# Patient Record
Sex: MALE | Race: AMERICAN INDIAN OR ALASKA NATIVE | NOT HISPANIC OR LATINO | URBAN - METROPOLITAN AREA
[De-identification: names, ages, dates, MRNs, and addresses within clinical notes are randomized per-mention and may not be internally consistent; named-entity substitution may affect disease eponyms.]

---

## 2018-05-26 ENCOUNTER — INPATIENT (INPATIENT)
Facility: HOSPITAL | Age: 28
LOS: 3 days | Discharge: HOME | End: 2018-05-30
Attending: INTERNAL MEDICINE | Admitting: INTERNAL MEDICINE
Payer: COMMERCIAL

## 2018-05-26 VITALS
RESPIRATION RATE: 16 BRPM | SYSTOLIC BLOOD PRESSURE: 118 MMHG | OXYGEN SATURATION: 100 % | TEMPERATURE: 98 F | HEART RATE: 92 BPM | DIASTOLIC BLOOD PRESSURE: 83 MMHG

## 2018-05-26 DIAGNOSIS — J45.901 UNSPECIFIED ASTHMA WITH (ACUTE) EXACERBATION: ICD-10-CM

## 2018-05-26 LAB
ALBUMIN SERPL ELPH-MCNC: 4.7 G/DL — SIGNIFICANT CHANGE UP (ref 3.3–5)
ALP SERPL-CCNC: 100 U/L — SIGNIFICANT CHANGE UP (ref 40–120)
ALT FLD-CCNC: 40 U/L — SIGNIFICANT CHANGE UP (ref 4–41)
AST SERPL-CCNC: 24 U/L — SIGNIFICANT CHANGE UP (ref 4–40)
B PERT DNA SPEC QL NAA+PROBE: SIGNIFICANT CHANGE UP
BASOPHILS # BLD AUTO: 0.34 K/UL — HIGH (ref 0–0.2)
BASOPHILS NFR BLD AUTO: 1.7 % — SIGNIFICANT CHANGE UP (ref 0–2)
BASOPHILS NFR SPEC: 2.7 % — HIGH (ref 0–2)
BILIRUB SERPL-MCNC: 0.5 MG/DL — SIGNIFICANT CHANGE UP (ref 0.2–1.2)
BUN SERPL-MCNC: 10 MG/DL — SIGNIFICANT CHANGE UP (ref 7–23)
C PNEUM DNA SPEC QL NAA+PROBE: NOT DETECTED — SIGNIFICANT CHANGE UP
CALCIUM SERPL-MCNC: 9.2 MG/DL — SIGNIFICANT CHANGE UP (ref 8.4–10.5)
CHLORIDE SERPL-SCNC: 100 MMOL/L — SIGNIFICANT CHANGE UP (ref 98–107)
CO2 SERPL-SCNC: 26 MMOL/L — SIGNIFICANT CHANGE UP (ref 22–31)
CREAT SERPL-MCNC: 1.3 MG/DL — SIGNIFICANT CHANGE UP (ref 0.5–1.3)
EOSINOPHIL # BLD AUTO: 4.74 K/UL — HIGH (ref 0–0.5)
EOSINOPHIL NFR BLD AUTO: 24 % — HIGH (ref 0–6)
EOSINOPHIL NFR FLD: 3.6 % — SIGNIFICANT CHANGE UP (ref 0–6)
FLUAV H1 2009 PAND RNA SPEC QL NAA+PROBE: NOT DETECTED — SIGNIFICANT CHANGE UP
FLUAV H1 RNA SPEC QL NAA+PROBE: NOT DETECTED — SIGNIFICANT CHANGE UP
FLUAV H3 RNA SPEC QL NAA+PROBE: NOT DETECTED — SIGNIFICANT CHANGE UP
FLUAV SUBTYP SPEC NAA+PROBE: SIGNIFICANT CHANGE UP
FLUBV RNA SPEC QL NAA+PROBE: NOT DETECTED — SIGNIFICANT CHANGE UP
GLUCOSE SERPL-MCNC: 88 MG/DL — SIGNIFICANT CHANGE UP (ref 70–99)
HADV DNA SPEC QL NAA+PROBE: NOT DETECTED — SIGNIFICANT CHANGE UP
HCOV 229E RNA SPEC QL NAA+PROBE: NOT DETECTED — SIGNIFICANT CHANGE UP
HCOV HKU1 RNA SPEC QL NAA+PROBE: NOT DETECTED — SIGNIFICANT CHANGE UP
HCOV NL63 RNA SPEC QL NAA+PROBE: NOT DETECTED — SIGNIFICANT CHANGE UP
HCOV OC43 RNA SPEC QL NAA+PROBE: NOT DETECTED — SIGNIFICANT CHANGE UP
HCT VFR BLD CALC: 47.6 % — SIGNIFICANT CHANGE UP (ref 39–50)
HGB BLD-MCNC: 16.3 G/DL — SIGNIFICANT CHANGE UP (ref 13–17)
HMPV RNA SPEC QL NAA+PROBE: NOT DETECTED — SIGNIFICANT CHANGE UP
HPIV1 RNA SPEC QL NAA+PROBE: NOT DETECTED — SIGNIFICANT CHANGE UP
HPIV2 RNA SPEC QL NAA+PROBE: NOT DETECTED — SIGNIFICANT CHANGE UP
HPIV3 RNA SPEC QL NAA+PROBE: NOT DETECTED — SIGNIFICANT CHANGE UP
HPIV4 RNA SPEC QL NAA+PROBE: NOT DETECTED — SIGNIFICANT CHANGE UP
IMM GRANULOCYTES # BLD AUTO: 0.13 # — SIGNIFICANT CHANGE UP
IMM GRANULOCYTES NFR BLD AUTO: 0.7 % — SIGNIFICANT CHANGE UP (ref 0–1.5)
LYMPHOCYTES # BLD AUTO: 16.6 % — SIGNIFICANT CHANGE UP (ref 13–44)
LYMPHOCYTES # BLD AUTO: 3.29 K/UL — SIGNIFICANT CHANGE UP (ref 1–3.3)
LYMPHOCYTES NFR SPEC AUTO: 9.9 % — LOW (ref 13–44)
M PNEUMO DNA SPEC QL NAA+PROBE: NOT DETECTED — SIGNIFICANT CHANGE UP
MCHC RBC-ENTMCNC: 28.7 PG — SIGNIFICANT CHANGE UP (ref 27–34)
MCHC RBC-ENTMCNC: 34.2 % — SIGNIFICANT CHANGE UP (ref 32–36)
MCV RBC AUTO: 83.8 FL — SIGNIFICANT CHANGE UP (ref 80–100)
MONOCYTES # BLD AUTO: 1.34 K/UL — HIGH (ref 0–0.9)
MONOCYTES NFR BLD AUTO: 6.8 % — SIGNIFICANT CHANGE UP (ref 2–14)
MONOCYTES NFR BLD: 1.8 % — LOW (ref 2–9)
MORPHOLOGY BLD-IMP: NORMAL — SIGNIFICANT CHANGE UP
NEUTROPHIL AB SER-ACNC: 77.5 % — HIGH (ref 43–77)
NEUTROPHILS # BLD AUTO: 9.94 K/UL — HIGH (ref 1.8–7.4)
NEUTROPHILS NFR BLD AUTO: 50.2 % — SIGNIFICANT CHANGE UP (ref 43–77)
NRBC # FLD: 0 — SIGNIFICANT CHANGE UP
PLATELET # BLD AUTO: 349 K/UL — SIGNIFICANT CHANGE UP (ref 150–400)
PLATELET COUNT - ESTIMATE: NORMAL — SIGNIFICANT CHANGE UP
PMV BLD: 9.9 FL — SIGNIFICANT CHANGE UP (ref 7–13)
POTASSIUM SERPL-MCNC: 4.5 MMOL/L — SIGNIFICANT CHANGE UP (ref 3.5–5.3)
POTASSIUM SERPL-SCNC: 4.5 MMOL/L — SIGNIFICANT CHANGE UP (ref 3.5–5.3)
PROT SERPL-MCNC: 8.4 G/DL — HIGH (ref 6–8.3)
RBC # BLD: 5.68 M/UL — SIGNIFICANT CHANGE UP (ref 4.2–5.8)
RBC # FLD: 13.3 % — SIGNIFICANT CHANGE UP (ref 10.3–14.5)
RSV RNA SPEC QL NAA+PROBE: NOT DETECTED — SIGNIFICANT CHANGE UP
RV+EV RNA SPEC QL NAA+PROBE: NOT DETECTED — SIGNIFICANT CHANGE UP
SODIUM SERPL-SCNC: 141 MMOL/L — SIGNIFICANT CHANGE UP (ref 135–145)
VARIANT LYMPHS # BLD: 4.5 % — SIGNIFICANT CHANGE UP
WBC # BLD: 19.78 K/UL — HIGH (ref 3.8–10.5)
WBC # FLD AUTO: 19.78 K/UL — HIGH (ref 3.8–10.5)

## 2018-05-26 PROCEDURE — 99223 1ST HOSP IP/OBS HIGH 75: CPT

## 2018-05-26 PROCEDURE — 71046 X-RAY EXAM CHEST 2 VIEWS: CPT | Mod: 26

## 2018-05-26 RX ORDER — IPRATROPIUM/ALBUTEROL SULFATE 18-103MCG
3 AEROSOL WITH ADAPTER (GRAM) INHALATION ONCE
Qty: 0 | Refills: 0 | Status: COMPLETED | OUTPATIENT
Start: 2018-05-26 | End: 2018-05-26

## 2018-05-26 RX ORDER — PANTOPRAZOLE SODIUM 20 MG/1
40 TABLET, DELAYED RELEASE ORAL
Qty: 0 | Refills: 0 | Status: DISCONTINUED | OUTPATIENT
Start: 2018-05-26 | End: 2018-05-27

## 2018-05-26 RX ORDER — MAGNESIUM SULFATE 500 MG/ML
2 VIAL (ML) INJECTION ONCE
Qty: 0 | Refills: 0 | Status: COMPLETED | OUTPATIENT
Start: 2018-05-26 | End: 2018-05-26

## 2018-05-26 RX ORDER — ALBUTEROL 90 UG/1
2.5 AEROSOL, METERED ORAL ONCE
Qty: 0 | Refills: 0 | Status: COMPLETED | OUTPATIENT
Start: 2018-05-26 | End: 2018-05-26

## 2018-05-26 RX ORDER — BUDESONIDE AND FORMOTEROL FUMARATE DIHYDRATE 160; 4.5 UG/1; UG/1
2 AEROSOL RESPIRATORY (INHALATION)
Qty: 0 | Refills: 0 | Status: DISCONTINUED | OUTPATIENT
Start: 2018-05-26 | End: 2018-05-30

## 2018-05-26 RX ORDER — LEVALBUTEROL 1.25 MG/.5ML
0.63 SOLUTION, CONCENTRATE RESPIRATORY (INHALATION) EVERY 4 HOURS
Qty: 0 | Refills: 0 | Status: DISCONTINUED | OUTPATIENT
Start: 2018-05-26 | End: 2018-05-30

## 2018-05-26 RX ADMIN — Medication 3 MILLILITER(S): at 16:03

## 2018-05-26 RX ADMIN — Medication 200 GRAM(S): at 16:22

## 2018-05-26 RX ADMIN — ALBUTEROL 2.5 MILLIGRAM(S): 90 AEROSOL, METERED ORAL at 21:18

## 2018-05-26 RX ADMIN — Medication 3 MILLILITER(S): at 15:47

## 2018-05-26 RX ADMIN — Medication 125 MILLIGRAM(S): at 16:05

## 2018-05-26 RX ADMIN — Medication 3 MILLILITER(S): at 16:22

## 2018-05-26 NOTE — H&P ADULT - NSHPSOCIALHISTORY_GEN_ALL_CORE
Social History:    Marital Status:  ( X )    (   ) Single    (   )    (  )   Occupation:   Lives with: (  ) alone  (  ) children   ( X ) spouse   (  ) parents  (  ) other    Substance Use (street drugs): ( X ) never used  (  ) other:  Tobacco Usage:  ( X ) never smoked   (   ) former smoker   (   ) current smoker  (     ) pack year  (        ) last cigarette date  Alcohol Usage: Denies

## 2018-05-26 NOTE — H&P ADULT - PROBLEM SELECTOR PLAN 4
- SCDs for DVT ppx - Patient with significant leukocytosis and tachycardia but no fevers/chills and no findings of PNA on CXR  - Currently not toxic looking, will therefore hold off on abx and monitor - Patient with significant leukocytosis and tachycardia but no fevers/chills and no findings of PNA on CXR  - Given his productive cough, would place patient on azithromycin for now - Patient with significant leukocytosis and tachycardia but no fevers/chills and no findings of PNA on CXR  - His cough has been present for a while and he has been on a few different abx without improvement, for now, would place on azithromycin for anti-inflammatory effect and monitor, will reassess if patient is febrile - Patient with significant leukocytosis and tachycardia but no fevers/chills and no findings of PNA on CXR  - His cough has been present for a while and he has been on a few different abx without improvement, for now, would place on azithromycin and monitor, will reassess if patient is febrile - Patient with significant leukocytosis and tachycardia but no fevers/chills and no findings of PNA on CXR  - His cough has been present for a while and he has been on a few different abx without improvement, for now, would place on levofloxacin and monitor - Patient with significant leukocytosis and tachycardia but no fevers/chills and no findings of PNA on CXR  - His cough has been present for a while and he has been on a few different abx without improvement, for now would place on levofloxacin and monitor - Patient with significant leukocytosis and tachycardia but no fevers/chills and no findings of PNA on CXR  - His cough has been present for a while and he has been on a few different abx without improvement, for now would place on levofloxacin and monitor  - Blood cultures if febrile
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
## 2018-05-26 NOTE — ED PROVIDER NOTE - OBJECTIVE STATEMENT
29 y/o M pt with no sig PMHx, BIB parents, arrives to the ED c/o a cough with associated dizziness for 1 week. Pt reports that these sx have led him experience a syncope episode (last episode was 1 week ago). Pt notes that his wife witnessed his last syncope episode. Pt notes hx of these sx in the past. Pt states that he saw his PMD for these sx in the past, and he has prescribed pt a Z-pac, Advair, and Prednisone; gave pt relief. Pt finished his Prednisone last week. PMD also had pt recently have blood work done; results unknown. Claritin, Allegra, and Benadryl to no relief. No recent travel.  by profession. when sx began last year; pt is now a . No hx of Asthma. Also c/o congestion. Denies fever or any other complaints. NKDA.

## 2018-05-26 NOTE — H&P ADULT - NSHPLABSRESULTS_GEN_ALL_CORE
LABS and ADDITIONAL STUDIES: LABS and ADDITIONAL STUDIES:                        16.3   19.78 )-----------( 349      ( 26 May 2018 16:00 )             47.6     05-26    141  |  100  |  10  ----------------------------<  88  4.5   |  26  |  1.30    Ca    9.2      26 May 2018 16:00    TPro  8.4<H>  /  Alb  4.7  /  TBili  0.5  /  DBili  x   /  AST  24  /  ALT  40  /  AlkPhos  100  05-26    LIVER FUNCTIONS - ( 26 May 2018 16:00 )  Alb: 4.7 g/dL / Pro: 8.4 g/dL / ALK PHOS: 100 u/L / ALT: 40 u/L / AST: 24 u/L / GGT: x           RVP Negative    < from: Xray Chest 2 Views PA/Lat (05.26.18 @ 19:06) >  ******PRELIMINARY REPORT******        INTERPRETATION:  No urgent/emergent findings.   < end of copied text > LABS and ADDITIONAL STUDIES:                        16.3   19.78 )-----------( 349      ( 26 May 2018 16:00 )             47.6     05-26    141  |  100  |  10  ----------------------------<  88  4.5   |  26  |  1.30    Ca    9.2      26 May 2018 16:00    TPro  8.4<H>  /  Alb  4.7  /  TBili  0.5  /  DBili  x   /  AST  24  /  ALT  40  /  AlkPhos  100  05-26    LIVER FUNCTIONS - ( 26 May 2018 16:00 )  Alb: 4.7 g/dL / Pro: 8.4 g/dL / ALK PHOS: 100 u/L / ALT: 40 u/L / AST: 24 u/L / GGT: x           RVP Negative    ECG - Sinus tach at 115, QTc 467    < from: Xray Chest 2 Views PA/Lat (05.26.18 @ 19:06) >  ******PRELIMINARY REPORT******        INTERPRETATION:  No urgent/emergent findings.   < end of copied text >

## 2018-05-26 NOTE — H&P ADULT - PROBLEM SELECTOR PLAN 3
- Patient said that he has severe coughing fits with resultant syncope after highly suspicious for tussive syncope, will therefore place patient on hycoden to help reduce the severity of his cough  - Will monitor on telemetry to r/o any cardiac arrhythmias as a cause of his syncope though the suspicion is low - Patient said that he has severe coughing fits with resultant syncope after - highly suspicious for tussive syncope, will therefore place patient on hycoden to help reduce the severity of his cough  - Will monitor on telemetry to r/o any cardiac arrhythmias as a cause of his syncope though the suspicion is low
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
## 2018-05-26 NOTE — H&P ADULT - FAMILY HISTORY
Grandparent  Still living? Unknown  Family history of asthma in grandfather, Age at diagnosis: Age Unknown

## 2018-05-26 NOTE — ED PROVIDER NOTE - PROGRESS NOTE DETAILS
HARRIET Butt: Pt has been refused by CDU since the cough and wheezing was worsening, will give hycodin, albuterol and admit to med on tele

## 2018-05-26 NOTE — H&P ADULT - ASSESSMENT
This is a 28M with no significant past medical history who presents with a chronic cough likely 2/2 combination of several factors (reactive airway disease, post nasal drip, and GERD) with tussive syncope.

## 2018-05-26 NOTE — ED ADULT NURSE NOTE - ED STAT RN HANDOFF DETAILS
pt. a&ox3, sinus tach on monitor, pt. desats to 91%, hooked on 3L O2 via nc and increased O2 sat - 96-97%. pt. states breathing improved minimally, states still feels pain on chest when he takes deep breaths. pt. with bed, report given to LICO Tinajero. will continue to monitor

## 2018-05-26 NOTE — H&P ADULT - NSHPREVIEWOFSYSTEMS_GEN_ALL_CORE
REVIEW OF SYSTEMS:    CONSTITUTIONAL: No weakness, fevers or chills  EYES/ENT: No visual changes, no discharge;  No dysphagia, +sinus congestion/ear congestion  NECK: No pain or stiffness  RESPIRATORY: +cough with production of yellow to brown sputum, +SOB/FERNANDO  CARDIOVASCULAR: No chest pain or palpitations; No lower extremity edema  GASTROINTESTINAL: No abdominal or epigastric pain. No nausea, vomiting, or hematemesis; No diarrhea or constipation. No melena or hematochezia.  BACK: No back pain  GENITOURINARY: No dysuria, frequency or hematuria  NEUROLOGICAL: No numbness or weakness  SKIN: No itching, burning, rashes, or lesions   All other review of systems is negative unless indicated above.

## 2018-05-26 NOTE — ED ADULT NURSE NOTE - OBJECTIVE STATEMENT
Patient present with a cough and evaluated by MD. Pt ordered medications and IV for asthma. Pt has family with him and labs drawn and sent after IVL placed to right AC 20 gauge.   LICO Moe
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
## 2018-05-26 NOTE — ED ADULT TRIAGE NOTE - CHIEF COMPLAINT QUOTE
pt states cough x1 yr, been to many ed's, 2 pulm mds, told he had asthma. pt appears to be in no acute resp distress

## 2018-05-26 NOTE — H&P ADULT - NSHPPHYSICALEXAM_GEN_ALL_CORE
Vital Signs Last 24 Hrs  T(C): 36.8 (26 May 2018 14:50), Max: 36.8 (26 May 2018 14:50)  T(F): 98.2 (26 May 2018 14:50), Max: 98.2 (26 May 2018 14:50)  HR: 119 (26 May 2018 21:13) (92 - 119)  BP: 127/76 (26 May 2018 21:13) (118/83 - 127/76)  BP(mean): --  RR: 18 (26 May 2018 21:13) (16 - 18)  SpO2: 94% (26 May 2018 21:13) (94% - 100%)    GENERAL: No acute distress, well-developed  HEAD:  Atraumatic, Normocephalic  ENT: EOMI, PERRLA, conjunctiva and sclera clear, Neck supple, No JVD, moist mucosa  CHEST/LUNG: Diffuse wheezing, no crackles or rhonchi audible currently  BACK: No spinal tenderness  HEART: Tachycardic; No murmurs, rubs, or gallops  ABDOMEN: Soft, Nontender, Nondistended; Bowel sounds present  EXTREMITIES:  No clubbing, cyanosis, or edema  PSYCH: Nl behavior, nl affect  NEUROLOGY: AAOx3, non-focal, FROM  SKIN: Normal color, No rashes or lesions

## 2018-05-26 NOTE — H&P ADULT - PROBLEM SELECTOR PLAN 1
- Likely multifactorial cause of patient's cough (reactive airway disease, nasal decongestant use, and GERD)  - Will place on prednisone PO, xopenex prns, and c/w home advair  - Patient evaluated by ENT, will hold his afrin, will place on saline spray and floanse as per ENT, loratidine daily  - Will place on pepcid daily for his GERD symptoms

## 2018-05-27 DIAGNOSIS — R65.10 SYSTEMIC INFLAMMATORY RESPONSE SYNDROME (SIRS) OF NON-INFECTIOUS ORIGIN WITHOUT ACUTE ORGAN DYSFUNCTION: ICD-10-CM

## 2018-05-27 DIAGNOSIS — Z29.9 ENCOUNTER FOR PROPHYLACTIC MEASURES, UNSPECIFIED: ICD-10-CM

## 2018-05-27 DIAGNOSIS — R05 COUGH: ICD-10-CM

## 2018-05-27 DIAGNOSIS — R06.2 WHEEZING: ICD-10-CM

## 2018-05-27 LAB
BUN SERPL-MCNC: 15 MG/DL — SIGNIFICANT CHANGE UP (ref 7–23)
CALCIUM SERPL-MCNC: 9 MG/DL — SIGNIFICANT CHANGE UP (ref 8.4–10.5)
CHLORIDE SERPL-SCNC: 100 MMOL/L — SIGNIFICANT CHANGE UP (ref 98–107)
CO2 SERPL-SCNC: 21 MMOL/L — LOW (ref 22–31)
CREAT SERPL-MCNC: 1.12 MG/DL — SIGNIFICANT CHANGE UP (ref 0.5–1.3)
GLUCOSE SERPL-MCNC: 128 MG/DL — HIGH (ref 70–99)
HCT VFR BLD CALC: 42.3 % — SIGNIFICANT CHANGE UP (ref 39–50)
HGB BLD-MCNC: 14.3 G/DL — SIGNIFICANT CHANGE UP (ref 13–17)
MAGNESIUM SERPL-MCNC: 2.4 MG/DL — SIGNIFICANT CHANGE UP (ref 1.6–2.6)
MCHC RBC-ENTMCNC: 29 PG — SIGNIFICANT CHANGE UP (ref 27–34)
MCHC RBC-ENTMCNC: 33.8 % — SIGNIFICANT CHANGE UP (ref 32–36)
MCV RBC AUTO: 85.8 FL — SIGNIFICANT CHANGE UP (ref 80–100)
NRBC # FLD: 0.02 — SIGNIFICANT CHANGE UP
PHOSPHATE SERPL-MCNC: 2.9 MG/DL — SIGNIFICANT CHANGE UP (ref 2.5–4.5)
PLATELET # BLD AUTO: 320 K/UL — SIGNIFICANT CHANGE UP (ref 150–400)
PMV BLD: 9.8 FL — SIGNIFICANT CHANGE UP (ref 7–13)
POTASSIUM SERPL-MCNC: 4.7 MMOL/L — SIGNIFICANT CHANGE UP (ref 3.5–5.3)
POTASSIUM SERPL-SCNC: 4.7 MMOL/L — SIGNIFICANT CHANGE UP (ref 3.5–5.3)
RBC # BLD: 4.93 M/UL — SIGNIFICANT CHANGE UP (ref 4.2–5.8)
RBC # FLD: 13.5 % — SIGNIFICANT CHANGE UP (ref 10.3–14.5)
SODIUM SERPL-SCNC: 134 MMOL/L — LOW (ref 135–145)
WBC # BLD: 17.79 K/UL — HIGH (ref 3.8–10.5)
WBC # FLD AUTO: 17.79 K/UL — HIGH (ref 3.8–10.5)

## 2018-05-27 PROCEDURE — 71250 CT THORAX DX C-: CPT | Mod: 26

## 2018-05-27 PROCEDURE — 99233 SBSQ HOSP IP/OBS HIGH 50: CPT

## 2018-05-27 RX ORDER — LORATADINE 10 MG/1
10 TABLET ORAL DAILY
Qty: 0 | Refills: 0 | Status: DISCONTINUED | OUTPATIENT
Start: 2018-05-27 | End: 2018-05-30

## 2018-05-27 RX ORDER — DIPHENHYDRAMINE HCL 50 MG
25 CAPSULE ORAL ONCE
Qty: 0 | Refills: 0 | Status: COMPLETED | OUTPATIENT
Start: 2018-05-27 | End: 2018-05-27

## 2018-05-27 RX ORDER — SODIUM CHLORIDE 9 MG/ML
1000 INJECTION INTRAMUSCULAR; INTRAVENOUS; SUBCUTANEOUS
Qty: 0 | Refills: 0 | Status: DISCONTINUED | OUTPATIENT
Start: 2018-05-27 | End: 2018-05-27

## 2018-05-27 RX ORDER — ACETAMINOPHEN 500 MG
650 TABLET ORAL EVERY 6 HOURS
Qty: 0 | Refills: 0 | Status: DISCONTINUED | OUTPATIENT
Start: 2018-05-27 | End: 2018-05-30

## 2018-05-27 RX ORDER — AZITHROMYCIN 500 MG/1
500 TABLET, FILM COATED ORAL ONCE
Qty: 0 | Refills: 0 | Status: COMPLETED | OUTPATIENT
Start: 2018-05-27 | End: 2018-05-27

## 2018-05-27 RX ORDER — SODIUM CHLORIDE 0.65 %
1 AEROSOL, SPRAY (ML) NASAL THREE TIMES A DAY
Qty: 0 | Refills: 0 | Status: DISCONTINUED | OUTPATIENT
Start: 2018-05-27 | End: 2018-05-30

## 2018-05-27 RX ORDER — FAMOTIDINE 10 MG/ML
20 INJECTION INTRAVENOUS DAILY
Qty: 0 | Refills: 0 | Status: DISCONTINUED | OUTPATIENT
Start: 2018-05-27 | End: 2018-05-28

## 2018-05-27 RX ORDER — FLUTICASONE PROPIONATE 50 MCG
1 SPRAY, SUSPENSION NASAL
Qty: 0 | Refills: 0 | Status: DISCONTINUED | OUTPATIENT
Start: 2018-05-27 | End: 2018-05-30

## 2018-05-27 RX ORDER — AZITHROMYCIN 500 MG/1
TABLET, FILM COATED ORAL
Qty: 0 | Refills: 0 | Status: DISCONTINUED | OUTPATIENT
Start: 2018-05-27 | End: 2018-05-27

## 2018-05-27 RX ADMIN — Medication 650 MILLIGRAM(S): at 20:50

## 2018-05-27 RX ADMIN — Medication 1 SPRAY(S): at 15:02

## 2018-05-27 RX ADMIN — Medication 40 MILLIGRAM(S): at 05:26

## 2018-05-27 RX ADMIN — Medication 1 SPRAY(S): at 05:26

## 2018-05-27 RX ADMIN — AZITHROMYCIN 250 MILLIGRAM(S): 500 TABLET, FILM COATED ORAL at 02:16

## 2018-05-27 RX ADMIN — BUDESONIDE AND FORMOTEROL FUMARATE DIHYDRATE 2 PUFF(S): 160; 4.5 AEROSOL RESPIRATORY (INHALATION) at 21:24

## 2018-05-27 RX ADMIN — LEVALBUTEROL 0.63 MILLIGRAM(S): 1.25 SOLUTION, CONCENTRATE RESPIRATORY (INHALATION) at 10:41

## 2018-05-27 RX ADMIN — Medication 1 SPRAY(S): at 16:51

## 2018-05-27 RX ADMIN — Medication 1 SPRAY(S): at 21:24

## 2018-05-27 RX ADMIN — Medication 650 MILLIGRAM(S): at 19:55

## 2018-05-27 RX ADMIN — Medication 25 MILLIGRAM(S): at 22:14

## 2018-05-27 RX ADMIN — BUDESONIDE AND FORMOTEROL FUMARATE DIHYDRATE 2 PUFF(S): 160; 4.5 AEROSOL RESPIRATORY (INHALATION) at 09:37

## 2018-05-27 RX ADMIN — FAMOTIDINE 20 MILLIGRAM(S): 10 INJECTION INTRAVENOUS at 12:09

## 2018-05-27 RX ADMIN — LEVALBUTEROL 0.63 MILLIGRAM(S): 1.25 SOLUTION, CONCENTRATE RESPIRATORY (INHALATION) at 17:02

## 2018-05-27 RX ADMIN — LORATADINE 10 MILLIGRAM(S): 10 TABLET ORAL at 12:09

## 2018-05-27 NOTE — CONSULT NOTE ADULT - ASSESSMENT
28M w/ post-tussive syncope with normal foe but exam/sxs consistent with postnasal drip likely 2/2 allergies  -no acute ent intervention  -nasal saline irrigation (lilliana med) TID  -flonase 2 sprays BID (use after irrigation)  -azelastine 2 sprays BID (use after irrigation)  -cetirizine 10mg daily  -f/u outpt ENT [phone] in 6-8 weeks  -f/u outpt with allergist  -STOP AFRIN   -explained plan to pt, agrees  -d/w ED team  -d/w dr denis
27 yo man with cough and asthma like symptoms associated with hyper eosinophilia, tree in bud and bronchiectasis (my read, Official pending).  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 At the same time would send Labs for ABPA, EGPA, would ask ENT to comment if they had noted nasal polyposis.     - Obtain outpatient recods of sputum cultures, blood work, and CT scans.     - Please send total IGE, Aspergillus antibodies and Aspergillus IGE/IG. Please Check ANCA.    - Check Sputum cx, strongyloides antibody, stool o/p (eval for helminth infection during camping)   - Would increase Nebulizer to Duonebs Q6 standing.    - Would offer Hycodan standing at night for symptom relief.   - can continue with prednisone 40 for now, once strongyloides antibody is negative will likely need to increase dose and provide prolonged taper.    Case to be discussed with attending in the morning.    Zheng Jones MD PGY5  Pulmonary and Critical Care Fellow  p# [phone]

## 2018-05-27 NOTE — PROGRESS NOTE ADULT - PROBLEM SELECTOR PLAN 4
- Patient with significant leukocytosis and tachycardia but no fevers/chills and no findings of PNA on CXR  - His cough has been present for a while and he has been on a few different abx without improvement, currently on levofloxacin, monitor for improvement  - Blood cultures if febrile

## 2018-05-27 NOTE — PROGRESS NOTE ADULT - ASSESSMENT
This is a 28M with no significant past medical history who presents with a chronic cough likely 2/2 combination of several factors (reactive airway disease, post nasal drip, and GERD) and tussive syncope.

## 2018-05-27 NOTE — CONSULT NOTE ADULT - ATTENDING COMMENTS
Chronic cough, wheezing, peripheral eosinophilia, tree in bud opacities possible bronchial dilation suggesting ABPA.  Would check IgE, RAST, if positive will need prolonged steroid course, BD and maintenance inhalers and outpatient PFTs.

## 2018-05-27 NOTE — PROGRESS NOTE ADULT - SUBJECTIVE AND OBJECTIVE BOX
Patient is a 28y old  Male who presents with a chief complaint of Cough (26 May 2018 22:46)    INTERVAL HPI/OVERNIGHT EVENTS:  Reports having nasal congestion/cough since being exposed to "fumigation", and that symptoms sometimes exacerbated by the heat. Reports that his symptoms did improve briefly after trying antibiotics and steroids (prescribed by his pulmonologist) but that the improvement only lasted for a week. Reports never having PFTs done before. Still feeling symptomatic, wearing supplemental O2.     MEDICATIONS  (STANDING):  buDESOnide 160 MICROgram(s)/formoterol 4.5 MICROgram(s) Inhaler 2 Puff(s) Inhalation two times a day  famotidine    Tablet 20 milliGRAM(s) Oral daily  fluticasone propionate 50 MICROgram(s)/spray Nasal Spray 1 Spray(s) Both Nostrils two times a day  levoFLOXacin IVPB      loratadine 10 milliGRAM(s) Oral daily  predniSONE   Tablet 40 milliGRAM(s) Oral daily  sodium chloride 0.65% Nasal 1 Spray(s) Both Nostrils three times a day    MEDICATIONS  (PRN):  HYDROcodone/homatropine Syrup 5 milliLiter(s) Oral every 4 hours PRN Cough  levalbuterol Inhalation 0.63 milliGRAM(s) Inhalation every 4 hours PRN Wheezing/Shortness of Breath    Allergies  No Known Allergies    Intolerances    REVIEW OF SYSTEMS:  Please see interval HPI:    Vital Signs Last 24 Hrs  T(C): 36.7 (27 May 2018 13:27), Max: 36.8 (26 May 2018 14:50)  T(F): 98 (27 May 2018 13:27), Max: 98.2 (26 May 2018 14:50)  HR: 86 (27 May 2018 13:27) (82 - 119)  BP: 118/63 (27 May 2018 13:27) (118/63 - 127/76)  BP(mean): --  RR: 17 (27 May 2018 13:27) (16 - 20)  SpO2: 92% (27 May 2018 13:27) (91% - 100%)  I&O's Detail    26 May 2018 07:01  -  27 May 2018 07:00  --------------------------------------------------------  IN:    IV PiggyBack: 250 mL    Oral Fluid: 100 mL  Total IN: 350 mL    OUT:  Total OUT: 0 mL    Total NET: 350 mL      27 May 2018 07:01  -  27 May 2018 13:55  --------------------------------------------------------  IN:    IV PiggyBack: 100 mL    Oral Fluid: 420 mL  Total IN: 520 mL    OUT:    Voided: 800 mL  Total OUT: 800 mL    Total NET: -280 mL    PHYSICAL EXAM:  GENERAL: NAD, sitting in bed  HEAD:  NC/AT  EYES: EOMI, clear sclera/conjunctiva  ENMT: MMM, NC in place  NECK: supple  NERVOUS SYSTEM: AOx3, moving all extremities, non-focal    CHEST/LUNG: breathing comfortably on NC, speaking in full sentences, diffuse coarse BS   HEART: S1S2 RRR  ABDOMEN: soft, non-tender   EXTREMITIES:  no c/c/e  SKIN: no obvious rash/lesion    LABS:                        14.3   17.79 )-----------( 320      ( 27 May 2018 07:26 )             42.3     27 May 2018 07:26    134    |  100    |  15     ----------------------------<  128    4.7     |  21     |  1.12     Ca    9.0        27 May 2018 07:26  Phos  2.9       27 May 2018 07:26  Mg     2.4       27 May 2018 07:26    TPro  8.4    /  Alb  4.7    /  TBili  0.5    /  DBili  x      /  AST  24     /  ALT  40     /  AlkPhos  100    26 May 2018 16:00      CAPILLARY BLOOD GLUCOSE        BLOOD CULTURE    RADIOLOGY & ADDITIONAL TESTS:    Imaging Personally Reviewed:  [x ] YES   CXR: clear lungs    Consultant(s) Notes Reviewed:      Care Discussed with Consultants/Other Providers: telePA

## 2018-05-27 NOTE — CONSULT NOTE ADULT - SUBJECTIVE AND OBJECTIVE BOX
CHIEF COMPLAINT:    HPI:    PAST MEDICAL & SURGICAL HISTORY:  No pertinent past medical history  No significant past surgical history      FAMILY HISTORY:  Family history of asthma in grandfather (Grandparent)      SOCIAL HISTORY:  Smoking: [ ] Never Smoked [ ] Former Smoker (__ packs x ___ years) [ ] Current Smoker  (__ packs x ___ years)  Substance Use: [ ] Never Used [ ] Used ____  EtOH Use:  Marital Status: [ ] Single [ ]  [ ]  [ ]   Sexual History:   Occupation:  Recent Travel:  Country of Birth:  Advance Directives:    Allergies    No Known Allergies    Intolerances        HOME MEDICATIONS:    REVIEW OF SYSTEMS:  Constitutional: [ ] negative [ ] fevers [ ] chills [ ] weight loss [ ] weight gain  HEENT: [ ] negative [ ] dry eyes [ ] eye irritation [ ] postnasal drip [ ] nasal congestion  CV: [ ] negative  [ ] chest pain [ ] orthopnea [ ] palpitations [ ] murmur  Resp: [ ] negative [ ] cough [ ] shortness of breath [ ] dyspnea [ ] wheezing [ ] sputum [ ] hemoptysis  GI: [ ] negative [ ] nausea [ ] vomiting [ ] diarrhea [ ] constipation [ ] abd pain [ ] dysphagia   : [ ] negative [ ] dysuria [ ] nocturia [ ] hematuria [ ] increased urinary frequency  Musculoskeletal: [ ] negative [ ] back pain [ ] myalgias [ ] arthralgias [ ] fracture  Skin: [ ] negative [ ] rash [ ] itch  Neurological: [ ] negative [ ] headache [ ] dizziness [ ] syncope [ ] weakness [ ] numbness  Psychiatric: [ ] negative [ ] anxiety [ ] depression  Endocrine: [ ] negative [ ] diabetes [ ] thyroid problem  Hematologic/Lymphatic: [ ] negative [ ] anemia [ ] bleeding problem  Allergic/Immunologic: [ ] negative [ ] itchy eyes [ ] nasal discharge [ ] hives [ ] angioedema  [ ] All other systems negative  [ ] Unable to assess ROS because ________    OBJECTIVE:  ICU Vital Signs Last 24 Hrs  T(C): 36.7 (27 May 2018 13:27), Max: 36.8 (26 May 2018 23:53)  T(F): 98 (27 May 2018 13:27), Max: 98.2 (26 May 2018 23:53)  HR: 91 (27 May 2018 17:02) (82 - 119)  BP: 118/63 (27 May 2018 13:27) (118/63 - 127/76)  BP(mean): --  ABP: --  ABP(mean): --  RR: 17 (27 May 2018 13:27) (17 - 20)  SpO2: 98% (27 May 2018 17:02) (91% - 98%)        05-26 @ 07:01  - 05-27 @ 07:00  --------------------------------------------------------  IN: 350 mL / OUT: 0 mL / NET: 350 mL    05-27 @ 07:01 - 05-27 @ 18:59  --------------------------------------------------------  IN: 1010 mL / OUT: 800 mL / NET: 210 mL      CAPILLARY BLOOD GLUCOSE          PHYSICAL EXAM:  General:   HEENT:   Lymph Nodes:  Neck:   Respiratory:   Cardiovascular:   Abdomen:   Extremities:   Skin:   Neurological:  Psychiatry:    HOSPITAL MEDICATIONS:  Standing Meds:  buDESOnide 160 MICROgram(s)/formoterol 4.5 MICROgram(s) Inhaler 2 Puff(s) Inhalation two times a day  famotidine    Tablet 20 milliGRAM(s) Oral daily  fluticasone propionate 50 MICROgram(s)/spray Nasal Spray 1 Spray(s) Both Nostrils two times a day  levoFLOXacin IVPB      loratadine 10 milliGRAM(s) Oral daily  predniSONE   Tablet 40 milliGRAM(s) Oral daily  sodium chloride 0.65% Nasal 1 Spray(s) Both Nostrils three times a day      PRN Meds:  HYDROcodone/homatropine Syrup 5 milliLiter(s) Oral every 4 hours PRN  levalbuterol Inhalation 0.63 milliGRAM(s) Inhalation every 4 hours PRN      LABS:                        14.3   17.79 )-----------( 320      ( 27 May 2018 07:26 )             42.3     Hgb Trend: 14.3<--, 16.3<--  05-27    134<L>  |  100  |  15  ----------------------------<  128<H>  4.7   |  21<L>  |  1.12    Ca    9.0      27 May 2018 07:26  Phos  2.9     05-27  Mg     2.4     05-27    TPro  8.4<H>  /  Alb  4.7  /  TBili  0.5  /  DBili  x   /  AST  24  /  ALT  40  /  AlkPhos  100  05-26    Creatinine Trend: 1.12<--, 1.30<--            MICROBIOLOGY:     RADIOLOGY:  [x] Reviewed and interpreted by me  Bronchiectasis, tree in bud opacities. No mediastinal LAD.   PULMONARY FUNCTION TESTS:    EKG: CHIEF COMPLAINT: Cough    HPI: 28 year old man with no PMH except 1 year of severe cough presents to the hospital for post tussive syncope. The cough is productive of thick brownish sputum and often comes in fits.  It is worsened with Allergies, and runny nose. It is always worst in the morning. He has episodes of subjective fevers but never over 100.  He has no wieght loss, loss of appetite, or night sweats, no chest pain, no dizziness, no pleuritic pain, no n/v/d, no abdominal paina and no bloody bowel movements. He has been on Advair and albuterol and derives only minimal relief.  He has used multiple over the counter medications for nasal congestion with limited effect.  He does improve with steroids but the improvement rarely lasts. + ROS is positive for skin rash that is itchy and rapidly resolves, GERD.  His cough tends to recur with in 1-2 weeks of steroid discontinuation.    He has been worked up for this but he is unaware of results and was never told a diagnosis except asthma.  His pulmonologist (only seen once) sent him for blood work and sputum. but he was unable to get the blood tests.    He denies SMOKING, ETOH use or drugs, Denies exposure to pets, birds, chronic duts or chemical exposures.  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  He also camps in [facility] and was last there a few month prior to developing symptoms.   He denies recent travel outside .  From [facility] in the 1999. 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             PAST MEDICAL & SURGICAL HISTORY:  No pertinent past medical history  No significant past surgical history      FAMILY HISTORY:  Family history of asthma in grandfather (Grandparent)      SOCIAL HISTORY:  Smoking: [ x] Never Smoked [ ] Former Smoker (__ packs x ___ years) [ ] Current Smoker  (__ packs x ___ years)  Substance Use: [x ] Never Used [ ] Used ____  EtOH Use:  Marital Status: [ ] Single [x ]  [ ]  [ ]   Sexual History:   Occupation:  Recent Travel:  Country of Birth:  Advance Directives:    Allergies    No Known Allergies    Intolerances        HOME MEDICATIONS: reviewed.     REVIEW OF SYSTEMS:[x] All other systems negative  [ ] Unable to assess ROS because ________    OBJECTIVE:  ICU Vital Signs Last 24 Hrs  T(C): 36.7 (27 May 2018 13:27), Max: 36.8 (26 May 2018 23:53)  T(F): 98 (27 May 2018 13:27), Max: 98.2 (26 May 2018 23:53)  HR: 91 (27 May 2018 17:02) (82 - 119)  BP: 118/63 (27 May 2018 13:27) (118/63 - 127/76)  BP(mean): --  ABP: --  ABP(mean): --  RR: 17 (27 May 2018 13:27) (17 - 20)  SpO2: 98% (27 May 2018 17:02) (91% - 98%)        05-26 @ 07:01 - 05-27 @ 07:00  --------------------------------------------------------  IN: 350 mL / OUT: 0 mL / NET: 350 mL    05-27 @ 07:01 - 05-27 @ 18:59  --------------------------------------------------------  IN: 1010 mL / OUT: 800 mL / NET: 210 mL      CAPILLARY BLOOD GLUCOSE          PHYSICAL EXAM:  General: Mild distress from Cough  HEENT: Edematous nasal mucos, with no obvious polyps  Lymph Nodes: No palpable LAD  Neck: no mas or JVD  Respiratory: Diffuse wheeze and ronchi  Cardiovascular: S1 S2 RRR  Abdomen: soft NT ND  Extremities: No edeam cyanosis, or clubbing  Skin: no rash  Neurological: AOx3    HOSPITAL MEDICATIONS:  Standing Meds:  buDESOnide 160 MICROgram(s)/formoterol 4.5 MICROgram(s) Inhaler 2 Puff(s) Inhalation two times a day  famotidine    Tablet 20 milliGRAM(s) Oral daily  fluticasone propionate 50 MICROgram(s)/spray Nasal Spray 1 Spray(s) Both Nostrils two times a day  levoFLOXacin IVPB      loratadine 10 milliGRAM(s) Oral daily  predniSONE   Tablet 40 milliGRAM(s) Oral daily  sodium chloride 0.65% Nasal 1 Spray(s) Both Nostrils three times a day      PRN Meds:  HYDROcodone/homatropine Syrup 5 milliLiter(s) Oral every 4 hours PRN  levalbuterol Inhalation 0.63 milliGRAM(s) Inhalation every 4 hours PRN      LABS:                        14.3   17.79 )-----------( 320      ( 27 May 2018 07:26 )             42.3     Hgb Trend: 14.3<--, 16.3<--  05-27    134<L>  |  100  |  15  ----------------------------<  128<H>  4.7   |  21<L>  |  1.12    Ca    9.0      27 May 2018 07:26  Phos  2.9     05-27  Mg     2.4     05-27    TPro  8.4<H>  /  Alb  4.7  /  TBili  0.5  /  DBili  x   /  AST  24  /  ALT  40  /  AlkPhos  100  05-26    Creatinine Trend: 1.12<--, 1.30<--            MICROBIOLOGY:     RADIOLOGY:  [x] Reviewed and interpreted by me  Bronchiectasis, tree in bud opacities. No mediastinal LAD.   PULMONARY FUNCTION TESTS:    EKG:

## 2018-05-27 NOTE — PROGRESS NOTE ADULT - PROBLEM SELECTOR PLAN 1
- Likely multifactorial cause of patient's cough (reactive airway disease, nasal decongestant use, postnasal drip 2/2 allergies and GERD)  - Will place on prednisone PO, xopenex prns, and c/w home advair  - Patient evaluated by ENT, will hold his afrin, c/w saline spray and floanse as per ENT, loratidine daily  - Will place on pepcid daily for his GERD symptoms

## 2018-05-27 NOTE — PROVIDER CONTACT NOTE (OTHER) - SITUATION
Pt passed out for twenty seconds from coughing so hard. Also complains of itching throat after budesonide inhaler and sodium chloride spray

## 2018-05-27 NOTE — PROGRESS NOTE ADULT - PROBLEM SELECTOR PLAN 3
- Patient said that he has severe coughing fits with resultant syncope after - highly suspicious for tussive syncope, will therefore place patient on hycoden to help reduce the severity of his cough  - Will monitor on telemetry to r/o any cardiac arrhythmias as a cause of his syncope though the suspicion is low

## 2018-05-27 NOTE — PROGRESS NOTE ADULT - PROBLEM SELECTOR PLAN 2
- Patient likely has undiagnosed reactive airway disease though given the history of exposure to fumigation gases would want to evaluate for other parenchymal lung disease  - CXR negative, CT Chest pending to further evaluate his lungs  - with eosinophilia (4.74k/ul)  - Will place on prednisone PO, xopenex prns, c/w home advair  - f/u pulmonary consult  - otpt PFTs

## 2018-05-28 LAB
BASOPHILS # BLD AUTO: 0.12 K/UL — SIGNIFICANT CHANGE UP (ref 0–0.2)
BASOPHILS NFR BLD AUTO: 0.7 % — SIGNIFICANT CHANGE UP (ref 0–2)
BUN SERPL-MCNC: 14 MG/DL — SIGNIFICANT CHANGE UP (ref 7–23)
CALCIUM SERPL-MCNC: 8.7 MG/DL — SIGNIFICANT CHANGE UP (ref 8.4–10.5)
CHLORIDE SERPL-SCNC: 102 MMOL/L — SIGNIFICANT CHANGE UP (ref 98–107)
CO2 SERPL-SCNC: 22 MMOL/L — SIGNIFICANT CHANGE UP (ref 22–31)
CREAT SERPL-MCNC: 1.21 MG/DL — SIGNIFICANT CHANGE UP (ref 0.5–1.3)
EOSINOPHIL # BLD AUTO: 1.29 K/UL — HIGH (ref 0–0.5)
EOSINOPHIL NFR BLD AUTO: 7.8 % — HIGH (ref 0–6)
GLUCOSE SERPL-MCNC: 106 MG/DL — HIGH (ref 70–99)
GRAM STN SPT: SIGNIFICANT CHANGE UP
HCT VFR BLD CALC: 42.2 % — SIGNIFICANT CHANGE UP (ref 39–50)
HGB BLD-MCNC: 14.2 G/DL — SIGNIFICANT CHANGE UP (ref 13–17)
IMM GRANULOCYTES # BLD AUTO: 0.22 # — SIGNIFICANT CHANGE UP
IMM GRANULOCYTES NFR BLD AUTO: 1.3 % — SIGNIFICANT CHANGE UP (ref 0–1.5)
LYMPHOCYTES # BLD AUTO: 17.8 % — SIGNIFICANT CHANGE UP (ref 13–44)
LYMPHOCYTES # BLD AUTO: 2.96 K/UL — SIGNIFICANT CHANGE UP (ref 1–3.3)
MAGNESIUM SERPL-MCNC: 2.3 MG/DL — SIGNIFICANT CHANGE UP (ref 1.6–2.6)
MCHC RBC-ENTMCNC: 28.8 PG — SIGNIFICANT CHANGE UP (ref 27–34)
MCHC RBC-ENTMCNC: 33.6 % — SIGNIFICANT CHANGE UP (ref 32–36)
MCV RBC AUTO: 85.6 FL — SIGNIFICANT CHANGE UP (ref 80–100)
MONOCYTES # BLD AUTO: 1.27 K/UL — HIGH (ref 0–0.9)
MONOCYTES NFR BLD AUTO: 7.6 % — SIGNIFICANT CHANGE UP (ref 2–14)
NEUTROPHILS # BLD AUTO: 10.78 K/UL — HIGH (ref 1.8–7.4)
NEUTROPHILS NFR BLD AUTO: 64.8 % — SIGNIFICANT CHANGE UP (ref 43–77)
NRBC # FLD: 0 — SIGNIFICANT CHANGE UP
PLATELET # BLD AUTO: 309 K/UL — SIGNIFICANT CHANGE UP (ref 150–400)
PMV BLD: 10.2 FL — SIGNIFICANT CHANGE UP (ref 7–13)
POTASSIUM SERPL-MCNC: 3.6 MMOL/L — SIGNIFICANT CHANGE UP (ref 3.5–5.3)
POTASSIUM SERPL-SCNC: 3.6 MMOL/L — SIGNIFICANT CHANGE UP (ref 3.5–5.3)
RBC # BLD: 4.93 M/UL — SIGNIFICANT CHANGE UP (ref 4.2–5.8)
RBC # FLD: 13.8 % — SIGNIFICANT CHANGE UP (ref 10.3–14.5)
SODIUM SERPL-SCNC: 138 MMOL/L — SIGNIFICANT CHANGE UP (ref 135–145)
SPECIMEN SOURCE: SIGNIFICANT CHANGE UP
WBC # BLD: 16.64 K/UL — HIGH (ref 3.8–10.5)
WBC # FLD AUTO: 16.64 K/UL — HIGH (ref 3.8–10.5)

## 2018-05-28 PROCEDURE — 99223 1ST HOSP IP/OBS HIGH 75: CPT | Mod: GC

## 2018-05-28 PROCEDURE — 99233 SBSQ HOSP IP/OBS HIGH 50: CPT

## 2018-05-28 RX ORDER — FAMOTIDINE 10 MG/ML
20 INJECTION INTRAVENOUS
Qty: 0 | Refills: 0 | Status: DISCONTINUED | OUTPATIENT
Start: 2018-05-28 | End: 2018-05-30

## 2018-05-28 RX ADMIN — Medication 1 SPRAY(S): at 05:42

## 2018-05-28 RX ADMIN — FAMOTIDINE 20 MILLIGRAM(S): 10 INJECTION INTRAVENOUS at 08:23

## 2018-05-28 RX ADMIN — BUDESONIDE AND FORMOTEROL FUMARATE DIHYDRATE 2 PUFF(S): 160; 4.5 AEROSOL RESPIRATORY (INHALATION) at 08:23

## 2018-05-28 RX ADMIN — Medication 1 SPRAY(S): at 13:14

## 2018-05-28 RX ADMIN — Medication 1 SPRAY(S): at 21:30

## 2018-05-28 RX ADMIN — Medication 40 MILLIGRAM(S): at 05:42

## 2018-05-28 RX ADMIN — BUDESONIDE AND FORMOTEROL FUMARATE DIHYDRATE 2 PUFF(S): 160; 4.5 AEROSOL RESPIRATORY (INHALATION) at 20:03

## 2018-05-28 RX ADMIN — LEVALBUTEROL 0.63 MILLIGRAM(S): 1.25 SOLUTION, CONCENTRATE RESPIRATORY (INHALATION) at 15:31

## 2018-05-28 RX ADMIN — LORATADINE 10 MILLIGRAM(S): 10 TABLET ORAL at 08:23

## 2018-05-28 RX ADMIN — Medication 1 SPRAY(S): at 17:46

## 2018-05-28 RX ADMIN — LEVALBUTEROL 0.63 MILLIGRAM(S): 1.25 SOLUTION, CONCENTRATE RESPIRATORY (INHALATION) at 23:49

## 2018-05-28 RX ADMIN — LEVALBUTEROL 0.63 MILLIGRAM(S): 1.25 SOLUTION, CONCENTRATE RESPIRATORY (INHALATION) at 19:48

## 2018-05-28 NOTE — PROGRESS NOTE ADULT - PROBLEM SELECTOR PLAN 2
Patient with suspected undiagnosed reactive airway disease though given the history of exposure to fumigation gases would want to evaluate for other parenchymal lung disease. CT chest revealed scattered bilateral tree-in-bud opacities, likely secondary to mucoid or debris impacted distal airways. CXR negative. Noted eosinophilia (4.74k/ul), now decreased while receiving steroids  - continue prednisone PO, xopenex prns, c/w symbicort  - f/u pulmonary recs  - outpatient PFTs

## 2018-05-28 NOTE — PROGRESS NOTE ADULT - PROBLEM SELECTOR PLAN 1
- Likely multifactorial cause of patient's cough (reactive airway disease, nasal decongestant use, postnasal drip 2/2 allergies and GERD)  - continue prednisone 40mg PO daily (day 3 of steroids), xopenex prns,   - continue with Symbicort 2 puffs BID while inpatient (pt reportedly on Advair at home)  - Patient evaluated by ENT, will hold his afrin, c/w saline spray and flonase as per ENT, loratidine daily  - Will place on pepcid 20mg BID for his GERD symptoms - Likely multifactorial cause of patient's cough (reactive airway disease, nasal decongestant use, postnasal drip 2/2 allergies and GERD)  - continue prednisone 40mg PO daily (day 3 of steroids), xopenex prns,   - continue with Symbicort 2 puffs BID while inpatient (pt reportedly on Advair at home)  - Patient evaluated by ENT, will hold his afrin, c/w saline spray and flonase as per ENT, loratidine daily  - Will place on pepcid 20mg BID for his GERD symptoms  - f/u sputum cx results

## 2018-05-28 NOTE — PROGRESS NOTE ADULT - SUBJECTIVE AND OBJECTIVE BOX
Patient is a 28y old  Male who presents with a chief complaint of Cough (26 May 2018 22:46)      SUBJECTIVE / OVERNIGHT EVENTS:  Patient still with coughing spells, but feels better than initial presentation. Check O2 sat - patient sat'ing 95-97% on RA. Patient reports hx of acid reflux.    MEDICATIONS  (STANDING):  buDESOnide 160 MICROgram(s)/formoterol 4.5 MICROgram(s) Inhaler 2 Puff(s) Inhalation two times a day  famotidine    Tablet 20 milliGRAM(s) Oral two times a day  fluticasone propionate 50 MICROgram(s)/spray Nasal Spray 1 Spray(s) Both Nostrils two times a day  levalbuterol Inhalation 0.63 milliGRAM(s) Inhalation every 4 hours  levoFLOXacin IVPB 500 milliGRAM(s) IV Intermittent every 24 hours  levoFLOXacin IVPB      loratadine 10 milliGRAM(s) Oral daily  predniSONE   Tablet 40 milliGRAM(s) Oral daily  sodium chloride 0.65% Nasal 1 Spray(s) Both Nostrils three times a day    MEDICATIONS  (PRN):  acetaminophen   Tablet. 650 milliGRAM(s) Oral every 6 hours PRN mild, moderate, severe pain  HYDROcodone/homatropine Syrup 5 milliLiter(s) Oral every 4 hours PRN Cough      Vital Signs Last 24 Hrs  T(C): 37.2 (28 May 2018 17:23), Max: 37.2 (28 May 2018 17:23)  T(F): 99 (28 May 2018 17:23), Max: 99 (28 May 2018 17:23)  HR: 91 (28 May 2018 17:23) (74 - 95)  BP: 111/69 (28 May 2018 17:23) (106/54 - 115/67)  BP(mean): --  RR: 18 (28 May 2018 17:23) (16 - 18)  SpO2: 95% (28 May 2018 17:23) (95% - 98%)          I&O's Summary    27 May 2018 07:01  -  28 May 2018 07:00  --------------------------------------------------------  IN: 1210 mL / OUT: 1550 mL / NET: -340 mL    28 May 2018 07:01  -  28 May 2018 18:45  --------------------------------------------------------  IN: 0 mL / OUT: 200 mL / NET: -200 mL          PHYSICAL EXAM  GENERAL: NAD, well-developed  HEAD:  Atraumatic, Normocephalic  EYES: EOMI, PERRLA, conjunctiva and sclera clear  NECK: Supple, No JVD  CHEST/LUNG: Diffuse wheezes. Good air movement. No crackles or rhonchi  HEART: tachycardic and regular rhythm; No murmurs, rubs, or gallops  ABDOMEN: Soft, Nontender, Nondistended; Bowel sounds present  EXTREMITIES:  2+ Peripheral Pulses, No clubbing, cyanosis, or edema  PSYCH: AAOx3  SKIN: No rashes or lesions      LABS:                        14.2   16.64 )-----------( 309      ( 28 May 2018 03:20 )             42.2     05-28    138  |  102  |  14  ----------------------------<  106<H>  3.6   |  22  |  1.21    Ca    8.7      28 May 2018 03:20  Phos  2.9     05-27  Mg     2.3     05-28          Consultant(s) Notes Reviewed:  Yes  Care Discussed with Consultants/Other Providers: Yes

## 2018-05-28 NOTE — PROGRESS NOTE ADULT - PROBLEM SELECTOR PLAN 4
Patient with significant leukocytosis and tachycardia but no fevers/chills and no findings of PNA on CXR. His cough has been present for a while and he has been on a few different abx without improvement, currently on levofloxacin (unclear of indication)  - will continue with levofloxacin --> will transition to oral regimen. Will need to discuss with pulm during of abx or if clinically indicated at this time for empiric therapy.  - Blood cultures if febrile Patient with significant leukocytosis and tachycardia but no fevers/chills and no findings of PNA on CXR. His cough has been present for a while and he has been on a few different abx without improvement, currently on levofloxacin (unclear of indication)  - will continue with levofloxacin --> will transition to oral regimen. Will need to discuss with pulm during of abx or if clinically indicated at this time for empiric therapy.  - Blood cultures if febrile  - f/u sputum cx results

## 2018-05-28 NOTE — PROGRESS NOTE ADULT - PROBLEM SELECTOR PLAN 3
- Patient said that he has severe coughing fits with resultant syncope after - highly suspicious for tussive syncope, will therefore place patient on hycoden to help reduce the severity of his cough. Patient with no events on telemetry for over 24 hours. No suspicion of cardiac origin.   - can likely d/c telemetry in the AM if patient continues to be stable with no arrhythmias noted on cardiac monitor

## 2018-05-29 LAB
BASOPHILS # BLD AUTO: 0.15 K/UL — SIGNIFICANT CHANGE UP (ref 0–0.2)
BASOPHILS NFR BLD AUTO: 0.9 % — SIGNIFICANT CHANGE UP (ref 0–2)
BUN SERPL-MCNC: 13 MG/DL — SIGNIFICANT CHANGE UP (ref 7–23)
CALCIUM SERPL-MCNC: 8.6 MG/DL — SIGNIFICANT CHANGE UP (ref 8.4–10.5)
CHLORIDE SERPL-SCNC: 99 MMOL/L — SIGNIFICANT CHANGE UP (ref 98–107)
CO2 SERPL-SCNC: 25 MMOL/L — SIGNIFICANT CHANGE UP (ref 22–31)
CREAT SERPL-MCNC: 1.18 MG/DL — SIGNIFICANT CHANGE UP (ref 0.5–1.3)
EOSINOPHIL # BLD AUTO: 2.37 K/UL — HIGH (ref 0–0.5)
EOSINOPHIL NFR BLD AUTO: 14.3 % — HIGH (ref 0–6)
ERYTHROCYTE [SEDIMENTATION RATE] IN BLOOD: 27 MM/HR — HIGH (ref 1–15)
GLUCOSE SERPL-MCNC: 102 MG/DL — HIGH (ref 70–99)
HCT VFR BLD CALC: 43.4 % — SIGNIFICANT CHANGE UP (ref 39–50)
HGB BLD-MCNC: 14.4 G/DL — SIGNIFICANT CHANGE UP (ref 13–17)
IGE SERPL-ACNC: 4925 IU/ML — HIGH (ref 0–100)
IMM GRANULOCYTES # BLD AUTO: 0.2 # — SIGNIFICANT CHANGE UP
IMM GRANULOCYTES NFR BLD AUTO: 1.2 % — SIGNIFICANT CHANGE UP (ref 0–1.5)
LYMPHOCYTES # BLD AUTO: 22.5 % — SIGNIFICANT CHANGE UP (ref 13–44)
LYMPHOCYTES # BLD AUTO: 3.73 K/UL — HIGH (ref 1–3.3)
MAGNESIUM SERPL-MCNC: 2.2 MG/DL — SIGNIFICANT CHANGE UP (ref 1.6–2.6)
MCHC RBC-ENTMCNC: 28.1 PG — SIGNIFICANT CHANGE UP (ref 27–34)
MCHC RBC-ENTMCNC: 33.2 % — SIGNIFICANT CHANGE UP (ref 32–36)
MCV RBC AUTO: 84.8 FL — SIGNIFICANT CHANGE UP (ref 80–100)
MONOCYTES # BLD AUTO: 1.25 K/UL — HIGH (ref 0–0.9)
MONOCYTES NFR BLD AUTO: 7.5 % — SIGNIFICANT CHANGE UP (ref 2–14)
NEUTROPHILS # BLD AUTO: 8.89 K/UL — HIGH (ref 1.8–7.4)
NEUTROPHILS NFR BLD AUTO: 53.6 % — SIGNIFICANT CHANGE UP (ref 43–77)
NRBC # FLD: 0 — SIGNIFICANT CHANGE UP
PLATELET # BLD AUTO: 292 K/UL — SIGNIFICANT CHANGE UP (ref 150–400)
PMV BLD: 9.9 FL — SIGNIFICANT CHANGE UP (ref 7–13)
POTASSIUM SERPL-MCNC: 3.7 MMOL/L — SIGNIFICANT CHANGE UP (ref 3.5–5.3)
POTASSIUM SERPL-SCNC: 3.7 MMOL/L — SIGNIFICANT CHANGE UP (ref 3.5–5.3)
RBC # BLD: 5.12 M/UL — SIGNIFICANT CHANGE UP (ref 4.2–5.8)
RBC # FLD: 13.6 % — SIGNIFICANT CHANGE UP (ref 10.3–14.5)
SODIUM SERPL-SCNC: 139 MMOL/L — SIGNIFICANT CHANGE UP (ref 135–145)
WBC # BLD: 16.59 K/UL — HIGH (ref 3.8–10.5)
WBC # FLD AUTO: 16.59 K/UL — HIGH (ref 3.8–10.5)

## 2018-05-29 PROCEDURE — 99233 SBSQ HOSP IP/OBS HIGH 50: CPT | Mod: GC

## 2018-05-29 PROCEDURE — 99232 SBSQ HOSP IP/OBS MODERATE 35: CPT

## 2018-05-29 RX ADMIN — LEVALBUTEROL 0.63 MILLIGRAM(S): 1.25 SOLUTION, CONCENTRATE RESPIRATORY (INHALATION) at 08:15

## 2018-05-29 RX ADMIN — LEVALBUTEROL 0.63 MILLIGRAM(S): 1.25 SOLUTION, CONCENTRATE RESPIRATORY (INHALATION) at 20:10

## 2018-05-29 RX ADMIN — Medication 1 SPRAY(S): at 06:14

## 2018-05-29 RX ADMIN — BUDESONIDE AND FORMOTEROL FUMARATE DIHYDRATE 2 PUFF(S): 160; 4.5 AEROSOL RESPIRATORY (INHALATION) at 09:18

## 2018-05-29 RX ADMIN — FAMOTIDINE 20 MILLIGRAM(S): 10 INJECTION INTRAVENOUS at 17:50

## 2018-05-29 RX ADMIN — Medication 1 SPRAY(S): at 21:04

## 2018-05-29 RX ADMIN — Medication 1 SPRAY(S): at 13:13

## 2018-05-29 RX ADMIN — Medication 1 SPRAY(S): at 06:15

## 2018-05-29 RX ADMIN — BUDESONIDE AND FORMOTEROL FUMARATE DIHYDRATE 2 PUFF(S): 160; 4.5 AEROSOL RESPIRATORY (INHALATION) at 21:05

## 2018-05-29 RX ADMIN — Medication 40 MILLIGRAM(S): at 06:14

## 2018-05-29 RX ADMIN — FAMOTIDINE 20 MILLIGRAM(S): 10 INJECTION INTRAVENOUS at 06:14

## 2018-05-29 RX ADMIN — LEVALBUTEROL 0.63 MILLIGRAM(S): 1.25 SOLUTION, CONCENTRATE RESPIRATORY (INHALATION) at 04:04

## 2018-05-29 RX ADMIN — LEVALBUTEROL 0.63 MILLIGRAM(S): 1.25 SOLUTION, CONCENTRATE RESPIRATORY (INHALATION) at 14:57

## 2018-05-29 RX ADMIN — LORATADINE 10 MILLIGRAM(S): 10 TABLET ORAL at 13:13

## 2018-05-29 RX ADMIN — Medication 1 SPRAY(S): at 17:50

## 2018-05-29 RX ADMIN — LEVALBUTEROL 0.63 MILLIGRAM(S): 1.25 SOLUTION, CONCENTRATE RESPIRATORY (INHALATION) at 10:47

## 2018-05-29 NOTE — PROVIDER CONTACT NOTE (OTHER) - RECOMMENDATIONS
Will contact provider for clarification, pulmonary consulted and may have sent sputum specimen with requisition.

## 2018-05-29 NOTE — PROGRESS NOTE ADULT - PROBLEM SELECTOR PLAN 4
Patient with significant leukocytosis and tachycardia but no fevers/chills and no findings of PNA on CXR. His cough has been present for a while and he has been on a few different abx without improvement, currently on levofloxacin, will f/u pulm  - f/u sputum cx results

## 2018-05-29 NOTE — PROGRESS NOTE ADULT - PROBLEM SELECTOR PLAN 3
- Patient said that he has severe coughing fits with resultant syncope after - highly suspicious for tussive syncope, will therefore place patient on hycodan to help reduce the severity of his cough. Patient with no events on telemetry for over 24 hours. No suspicion of cardiac origin.   - can likely d/c telemetry in the AM if patient continues to be stable with no arrhythmias noted on cardiac monitor

## 2018-05-29 NOTE — PROGRESS NOTE ADULT - SUBJECTIVE AND OBJECTIVE BOX
Patient is a 28y old  Male who presents with a chief complaint of Cough (26 May 2018 22:46)      SUBJECTIVE / OVERNIGHT EVENTS:  Pt coughed blood tinged mucus. denied cp/sob/fever    MEDICATIONS  (STANDING):  buDESOnide 160 MICROgram(s)/formoterol 4.5 MICROgram(s) Inhaler 2 Puff(s) Inhalation two times a day  famotidine    Tablet 20 milliGRAM(s) Oral two times a day  fluticasone propionate 50 MICROgram(s)/spray Nasal Spray 1 Spray(s) Both Nostrils two times a day  levalbuterol Inhalation 0.63 milliGRAM(s) Inhalation every 4 hours  levoFLOXacin  Tablet 500 milliGRAM(s) Oral daily  loratadine 10 milliGRAM(s) Oral daily  predniSONE   Tablet 40 milliGRAM(s) Oral daily  sodium chloride 0.65% Nasal 1 Spray(s) Both Nostrils three times a day    MEDICATIONS  (PRN):  acetaminophen   Tablet. 650 milliGRAM(s) Oral every 6 hours PRN mild, moderate, severe pain  HYDROcodone/homatropine Syrup 5 milliLiter(s) Oral every 4 hours PRN Cough      T(C): 37 (05-29-18 @ 12:30), Max: 37.2 (05-28-18 @ 17:23)  HR: 82 (05-29-18 @ 14:57) (74 - 92)  BP: 101/55 (05-29-18 @ 12:30) (98/55 - 111/69)  RR: 16 (05-29-18 @ 12:30) (16 - 18)  SpO2: 97% (05-29-18 @ 14:57) (94% - 99%)  CAPILLARY BLOOD GLUCOSE        I&O's Summary    28 May 2018 07:01  -  29 May 2018 07:00  --------------------------------------------------------  IN: 0 mL / OUT: 200 mL / NET: -200 mL    29 May 2018 07:01  -  29 May 2018 15:25  --------------------------------------------------------  IN: 240 mL / OUT: 0 mL / NET: 240 mL        PHYSICAL EXAM:  GENERAL: NAD, well-developed  HEAD:  Atraumatic, Normocephalic  EYES: EOMI, PERRLA, conjunctiva and sclera clear  NECK: Supple, No JVD  CHEST/LUNG: + expiratory wheezing  HEART: s1 s2, regular rhythm and rate   ABDOMEN: Soft, Nontender, Nondistended; Bowel sounds present  EXTREMITIES:  2+ Peripheral Pulses, No clubbing, cyanosis, or edema  PSYCH: AAOx3, calm   NEUROLOGY: non-focal  SKIN: No rashes or lesions    LABS:                        14.4   16.59 )-----------( 292      ( 29 May 2018 06:26 )             43.4     05-29    139  |  99  |  13  ----------------------------<  102<H>  3.7   |  25  |  1.18    Ca    8.6      29 May 2018 06:26  Mg     2.2     05-29                RADIOLOGY & ADDITIONAL TESTS:    Imaging Personally Reviewed:    Consultant(s) Notes Reviewed:      Care Discussed with Consultants/Other Providers:

## 2018-05-29 NOTE — PROVIDER CONTACT NOTE (OTHER) - SITUATION
Was called by Asaf Rosario in [facility] [phone] with a specimen that was sent (appearing to be sputum) without proper requisition.

## 2018-05-29 NOTE — PROGRESS NOTE ADULT - SUBJECTIVE AND OBJECTIVE BOX
Interval Events:  Symptoms improved but still coughing  Denies dyspnea, chest pain, or palpitations    REVIEW OF SYSTEMS:  [x] All other systems negative  [ ] Unable to assess ROS because ________    OBJECTIVE:  ICU Vital Signs Last 24 Hrs  T(C): 36.8 (29 May 2018 06:13), Max: 37.2 (28 May 2018 17:23)  T(F): 98.2 (29 May 2018 06:13), Max: 99 (28 May 2018 17:23)  HR: 80 (29 May 2018 10:47) (74 - 95)  BP: 98/55 (29 May 2018 06:13) (98/55 - 115/67)  BP(mean): --  ABP: --  ABP(mean): --  RR: 18 (29 May 2018 06:13) (16 - 18)  SpO2: 97% (29 May 2018 10:47) (94% - 99%)        05-28 @ 07:01  -  05-29 @ 07:00  --------------------------------------------------------  IN: 0 mL / OUT: 200 mL / NET: -200 mL      CAPILLARY BLOOD GLUCOSE          PHYSICAL EXAM:  General: NAD  HEENT: Edematous nasal mucosa, with no obvious polyps  Neck: Supple, no JVD  Respiratory: Diffuse wheezing throughout. No use of accessory muscles.  Cardiovascular: RRR, no murmur, no edema  Abdomen: Soft, nontender  Extremities: No cyanosis or clubbing  Skin: Warm and Intact  Neurological: AOx3    [facility] MEDICATIONS:    levoFLOXacin  Tablet 500 milliGRAM(s) Oral daily      predniSONE   Tablet 40 milliGRAM(s) Oral daily    buDESOnide 160 MICROgram(s)/formoterol 4.5 MICROgram(s) Inhaler 2 Puff(s) Inhalation two times a day  HYDROcodone/homatropine Syrup 5 milliLiter(s) Oral every 4 hours PRN  levalbuterol Inhalation 0.63 milliGRAM(s) Inhalation every 4 hours  loratadine 10 milliGRAM(s) Oral daily    acetaminophen   Tablet. 650 milliGRAM(s) Oral every 6 hours PRN    famotidine    Tablet 20 milliGRAM(s) Oral two times a day            fluticasone propionate 50 MICROgram(s)/spray Nasal Spray 1 Spray(s) Both Nostrils two times a day  sodium chloride 0.65% Nasal 1 Spray(s) Both Nostrils three times a day        LABS:                        14.4   16.59 )-----------( 292      ( 29 May 2018 06:26 )             43.4     Hgb Trend: 14.4<--, 14.2<--, 14.3<--, 16.3<--  05-29    139  |  99  |  13  ----------------------------<  102<H>  3.7   |  25  |  1.18    Ca    8.6      29 May 2018 06:26  Mg     2.2     05-29      Creatinine Trend: 1.18<--, 1.21<--, 1.12<--, 1.30<--            MICROBIOLOGY:     RADIOLOGY:  [x] Reviewed and interpreted by me    ASSESSMENT AND RECOMMENDATION    28 M cough and asthma like symptoms associated with eosinophilia and tree-in-bud on CT chest. ENT exam revealed no edema and widely patent airway. No mention of the presence of polyps. Sputum culture with polymicrobial gram stain, awaiting culture.    Follow up IgE levels, aspergillus antibodies, strongyloides, and ANCA  Stool O/P  Continue prednisone 40 mg daily  Duonebs Q6H, Hyocodan for cough PRN  Symbicort for maintenance   If infectious work up negative will need prolonged steroid taper  Requesting to follow up with pulmonary clinic here - will need outpatient PFTs (per patient he has never had any done)    Marky Benavides MD  Pulmonary and Critical Care Fellow  [phone]

## 2018-05-29 NOTE — PROGRESS NOTE ADULT - PROBLEM SELECTOR PLAN 1
- Likely multifactorial cause of patient's cough (reactive airway disease, nasal decongestant use, postnasal drip 2/2 allergies and GERD)  - continue prednisone 40mg PO daily (day 4 of steroids), xopenex prns,   - continue with Symbicort 2 puffs BID while inpatient (pt reportedly on Advair at home)  - Patient evaluated by ENT, will hold his afrin, c/w saline spray and flonase as per ENT, loratidine daily  - Will place on pepcid 20mg BID for his GERD symptoms  - f/u sputum cx results  f/u pulm

## 2018-05-30 ENCOUNTER — TRANSCRIPTION ENCOUNTER (OUTPATIENT)
Age: 28
End: 2018-05-30

## 2018-05-30 VITALS — OXYGEN SATURATION: 97 %

## 2018-05-30 LAB
BACTERIA SPT RESP CULT: SIGNIFICANT CHANGE UP
BASOPHILS # BLD AUTO: 0.14 K/UL — SIGNIFICANT CHANGE UP (ref 0–0.2)
BASOPHILS NFR BLD AUTO: 0.9 % — SIGNIFICANT CHANGE UP (ref 0–2)
BUN SERPL-MCNC: 16 MG/DL — SIGNIFICANT CHANGE UP (ref 7–23)
C-ANCA SER-ACNC: NEGATIVE — SIGNIFICANT CHANGE UP
CALCIUM SERPL-MCNC: 8.6 MG/DL — SIGNIFICANT CHANGE UP (ref 8.4–10.5)
CHLORIDE SERPL-SCNC: 96 MMOL/L — LOW (ref 98–107)
CO2 SERPL-SCNC: 24 MMOL/L — SIGNIFICANT CHANGE UP (ref 22–31)
CREAT SERPL-MCNC: 1.1 MG/DL — SIGNIFICANT CHANGE UP (ref 0.5–1.3)
EOSINOPHIL # BLD AUTO: 2.32 K/UL — HIGH (ref 0–0.5)
EOSINOPHIL NFR BLD AUTO: 14.6 % — HIGH (ref 0–6)
GLUCOSE SERPL-MCNC: 101 MG/DL — HIGH (ref 70–99)
HCT VFR BLD CALC: 41.7 % — SIGNIFICANT CHANGE UP (ref 39–50)
HCT VFR BLD CALC: 41.7 % — SIGNIFICANT CHANGE UP (ref 39–50)
HGB BLD-MCNC: 14.2 G/DL — SIGNIFICANT CHANGE UP (ref 13–17)
HGB BLD-MCNC: 14.2 G/DL — SIGNIFICANT CHANGE UP (ref 13–17)
HIV 1+2 AB+HIV1 P24 AG SERPL QL IA: SIGNIFICANT CHANGE UP
IMM GRANULOCYTES # BLD AUTO: 0.21 # — SIGNIFICANT CHANGE UP
IMM GRANULOCYTES NFR BLD AUTO: 1.3 % — SIGNIFICANT CHANGE UP (ref 0–1.5)
LYMPHOCYTES # BLD AUTO: 22.9 % — SIGNIFICANT CHANGE UP (ref 13–44)
LYMPHOCYTES # BLD AUTO: 3.65 K/UL — HIGH (ref 1–3.3)
MAGNESIUM SERPL-MCNC: 2.1 MG/DL — SIGNIFICANT CHANGE UP (ref 1.6–2.6)
MCHC RBC-ENTMCNC: 28.1 PG — SIGNIFICANT CHANGE UP (ref 27–34)
MCHC RBC-ENTMCNC: 28.1 PG — SIGNIFICANT CHANGE UP (ref 27–34)
MCHC RBC-ENTMCNC: 34.1 % — SIGNIFICANT CHANGE UP (ref 32–36)
MCHC RBC-ENTMCNC: 34.1 % — SIGNIFICANT CHANGE UP (ref 32–36)
MCV RBC AUTO: 82.4 FL — SIGNIFICANT CHANGE UP (ref 80–100)
MCV RBC AUTO: 82.4 FL — SIGNIFICANT CHANGE UP (ref 80–100)
MONOCYTES # BLD AUTO: 1.2 K/UL — HIGH (ref 0–0.9)
MONOCYTES NFR BLD AUTO: 7.5 % — SIGNIFICANT CHANGE UP (ref 2–14)
NEUTROPHILS # BLD AUTO: 8.41 K/UL — HIGH (ref 1.8–7.4)
NEUTROPHILS NFR BLD AUTO: 52.8 % — SIGNIFICANT CHANGE UP (ref 43–77)
NRBC # FLD: 0 — SIGNIFICANT CHANGE UP
NRBC # FLD: 0 — SIGNIFICANT CHANGE UP
P-ANCA SER-ACNC: NEGATIVE — SIGNIFICANT CHANGE UP
PLATELET # BLD AUTO: 290 K/UL — SIGNIFICANT CHANGE UP (ref 150–400)
PLATELET # BLD AUTO: 290 K/UL — SIGNIFICANT CHANGE UP (ref 150–400)
PMV BLD: 9.8 FL — SIGNIFICANT CHANGE UP (ref 7–13)
PMV BLD: 9.8 FL — SIGNIFICANT CHANGE UP (ref 7–13)
POTASSIUM SERPL-MCNC: 3.9 MMOL/L — SIGNIFICANT CHANGE UP (ref 3.5–5.3)
POTASSIUM SERPL-SCNC: 3.9 MMOL/L — SIGNIFICANT CHANGE UP (ref 3.5–5.3)
RBC # BLD: 5.06 M/UL — SIGNIFICANT CHANGE UP (ref 4.2–5.8)
RBC # BLD: 5.06 M/UL — SIGNIFICANT CHANGE UP (ref 4.2–5.8)
RBC # FLD: 13.9 % — SIGNIFICANT CHANGE UP (ref 10.3–14.5)
RBC # FLD: 13.9 % — SIGNIFICANT CHANGE UP (ref 10.3–14.5)
SODIUM SERPL-SCNC: 136 MMOL/L — SIGNIFICANT CHANGE UP (ref 135–145)
STRONGYLOIDES AB SER-ACNC: NEGATIVE — SIGNIFICANT CHANGE UP
WBC # BLD: 15.93 K/UL — HIGH (ref 3.8–10.5)
WBC # BLD: 15.93 K/UL — HIGH (ref 3.8–10.5)
WBC # FLD AUTO: 15.93 K/UL — HIGH (ref 3.8–10.5)
WBC # FLD AUTO: 15.93 K/UL — HIGH (ref 3.8–10.5)

## 2018-05-30 PROCEDURE — 99239 HOSP IP/OBS DSCHRG MGMT >30: CPT

## 2018-05-30 PROCEDURE — 99233 SBSQ HOSP IP/OBS HIGH 50: CPT | Mod: GC

## 2018-05-30 RX ORDER — BUDESONIDE AND FORMOTEROL FUMARATE DIHYDRATE 160; 4.5 UG/1; UG/1
2 AEROSOL RESPIRATORY (INHALATION)
Qty: 1 | Refills: 0 | OUTPATIENT
Start: 2018-05-30 | End: 2018-06-28

## 2018-05-30 RX ORDER — ALBUTEROL 90 UG/1
2 AEROSOL, METERED ORAL
Qty: 0 | Refills: 0 | COMMUNITY

## 2018-05-30 RX ORDER — FLUTICASONE PROPIONATE AND SALMETEROL 50; 250 UG/1; UG/1
2 POWDER ORAL; RESPIRATORY (INHALATION)
Qty: 0 | Refills: 0 | COMMUNITY

## 2018-05-30 RX ORDER — OXYMETAZOLINE HYDROCHLORIDE 0.5 MG/ML
2 SPRAY NASAL
Qty: 0 | Refills: 0 | COMMUNITY

## 2018-05-30 RX ORDER — MULTIVIT-MIN/FERROUS GLUCONATE 9 MG/15 ML
1 LIQUID (ML) ORAL
Qty: 0 | Refills: 0 | COMMUNITY

## 2018-05-30 RX ORDER — FAMOTIDINE 10 MG/ML
1 INJECTION INTRAVENOUS
Qty: 28 | Refills: 0 | OUTPATIENT
Start: 2018-05-30 | End: 2018-06-12

## 2018-05-30 RX ORDER — SODIUM CHLORIDE 0.65 %
1 AEROSOL, SPRAY (ML) NASAL
Qty: 1 | Refills: 0 | OUTPATIENT
Start: 2018-05-30

## 2018-05-30 RX ORDER — FLUTICASONE PROPIONATE 50 MCG
1 SPRAY, SUSPENSION NASAL
Qty: 1 | Refills: 0 | OUTPATIENT
Start: 2018-05-30 | End: 2018-06-28

## 2018-05-30 RX ORDER — LORATADINE 10 MG/1
1 TABLET ORAL
Qty: 14 | Refills: 0 | OUTPATIENT
Start: 2018-05-30 | End: 2018-06-12

## 2018-05-30 RX ADMIN — LEVALBUTEROL 0.63 MILLIGRAM(S): 1.25 SOLUTION, CONCENTRATE RESPIRATORY (INHALATION) at 04:18

## 2018-05-30 RX ADMIN — LEVALBUTEROL 0.63 MILLIGRAM(S): 1.25 SOLUTION, CONCENTRATE RESPIRATORY (INHALATION) at 00:00

## 2018-05-30 RX ADMIN — LEVALBUTEROL 0.63 MILLIGRAM(S): 1.25 SOLUTION, CONCENTRATE RESPIRATORY (INHALATION) at 07:42

## 2018-05-30 RX ADMIN — Medication 60 MILLIGRAM(S): at 05:39

## 2018-05-30 RX ADMIN — Medication 1 SPRAY(S): at 05:39

## 2018-05-30 RX ADMIN — LEVALBUTEROL 0.63 MILLIGRAM(S): 1.25 SOLUTION, CONCENTRATE RESPIRATORY (INHALATION) at 11:01

## 2018-05-30 RX ADMIN — LEVALBUTEROL 0.63 MILLIGRAM(S): 1.25 SOLUTION, CONCENTRATE RESPIRATORY (INHALATION) at 14:53

## 2018-05-30 RX ADMIN — FAMOTIDINE 20 MILLIGRAM(S): 10 INJECTION INTRAVENOUS at 05:39

## 2018-05-30 RX ADMIN — LORATADINE 10 MILLIGRAM(S): 10 TABLET ORAL at 12:39

## 2018-05-30 RX ADMIN — BUDESONIDE AND FORMOTEROL FUMARATE DIHYDRATE 2 PUFF(S): 160; 4.5 AEROSOL RESPIRATORY (INHALATION) at 10:10

## 2018-05-30 NOTE — PROGRESS NOTE ADULT - PROBLEM SELECTOR PLAN 1
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

## 2018-05-30 NOTE — PROGRESS NOTE ADULT - SUBJECTIVE AND OBJECTIVE BOX
Patient is a 28y old  Male who presents with a chief complaint of Cough (30 May 2018 11:27)      SUBJECTIVE / OVERNIGHT EVENTS:  Pt feels better overall. still + cough, but improved. no hemoptysis     MEDICATIONS  (STANDING):  buDESOnide 160 MICROgram(s)/formoterol 4.5 MICROgram(s) Inhaler 2 Puff(s) Inhalation two times a day  famotidine    Tablet 20 milliGRAM(s) Oral two times a day  fluticasone propionate 50 MICROgram(s)/spray Nasal Spray 1 Spray(s) Both Nostrils two times a day  levalbuterol Inhalation 0.63 milliGRAM(s) Inhalation every 4 hours  levoFLOXacin  Tablet 500 milliGRAM(s) Oral daily  loratadine 10 milliGRAM(s) Oral daily  predniSONE   Tablet 60 milliGRAM(s) Oral daily  sodium chloride 0.65% Nasal 1 Spray(s) Both Nostrils three times a day    MEDICATIONS  (PRN):  acetaminophen   Tablet. 650 milliGRAM(s) Oral every 6 hours PRN mild, moderate, severe pain  HYDROcodone/homatropine Syrup 5 milliLiter(s) Oral every 4 hours PRN Cough      T(C): 36.7 (05-30-18 @ 05:41), Max: 37 (05-29-18 @ 12:30)  HR: 90 (05-30-18 @ 11:02) (73 - 97)  BP: 105/63 (05-30-18 @ 05:41) (101/55 - 126/72)  RR: 16 (05-30-18 @ 05:41) (16 - 16)  SpO2: 97% (05-30-18 @ 11:02) (94% - 100%)  CAPILLARY BLOOD GLUCOSE        I&O's Summary    29 May 2018 07:01  -  30 May 2018 07:00  --------------------------------------------------------  IN: 240 mL / OUT: 0 mL / NET: 240 mL        PHYSICAL EXAM:  GENERAL: NAD, well-developed  HEAD:  Atraumatic, Normocephalic  EYES: EOMI, PERRLA, conjunctiva and sclera clear  NECK: Supple, No JVD  CHEST/LUNG: + expiratory wheezing, non labored in breathing  HEART: s1 s2, regular rhythm and rate   ABDOMEN: Soft, Nontender, Nondistended; Bowel sounds present  EXTREMITIES:  2+ Peripheral Pulses, No clubbing, cyanosis, or edema  PSYCH: AAOx3, calm   NEUROLOGY: non-focal  SKIN: No rashes or lesions    LABS:                        14.2   15.93 )-----------( 290      ( 30 May 2018 06:15 )             41.7     05-30    136  |  96<L>  |  16  ----------------------------<  101<H>  3.9   |  24  |  1.10    Ca    8.6      30 May 2018 06:15  Mg     2.1     05-30                RADIOLOGY & ADDITIONAL TESTS:    Imaging Personally Reviewed:    Consultant(s) Notes Reviewed:      Care Discussed with Consultants/Other Providers:

## 2018-05-30 NOTE — DISCHARGE NOTE ADULT - HOSPITAL COURSE
28M w/o sig PMHx, BIB parents, arrives to the ED c/o a cough with associated dizziness for 1 week. Pt reports that these sx have led him experience a syncope episode (last episode was 1 week ago). Pt notes that his wife witnessed his last syncope episode. Pt notes hx of these sx in the past x 1 yr since his father painted the house. Pt states that he saw his PMD for these sx in the past, and he has prescribed pt a Z-pac, Advair, and Prednisone but sxs always returned. finished his Prednisone last week. 28M w/o sig PMHx, BIB parents, arrives to the ED c/o a cough with associated dizziness for 1 week. Pt reports that these sx have led him experience a syncope episode (last episode was 1 week ago). Pt notes that his wife witnessed his last syncope episode. Pt notes hx of these sx in the past x 1 yr since his father painted the house. Pt states that he saw his PMD for these sx in the past, and he has prescribed pt a Z-pac, Advair, and Prednisone but sxs always returned. finished his Prednisone last week.   , Aspergillus antibodies and Aspergillus IGE/IG.  ANCA was all sent to the lab and are pending    Sputum cx, strongyloides antibody, stool o/p (eval for helminth infection during camping) sent. Pt found with  peripheral eosinophilia, tree in bud opacities on CT chest-possible bronchial dilation suggesting ABPA. Prednisone 40 mg, Symbicort and Hycodan started, and while pt was in hospital was treated with Levoquin till infectious etiology ruled out by sputum culture. ENT examined pt -exam/sxs consistent with postnasal drip likely 2/2 allergies, recommended Flonase, loratidine, and nasal ocean spray. 28M w/o sig PMHx, BIB parents, arrives to the ED c/o a cough with associated dizziness for 1 week. Pt reports that these sx have led him experience a syncope episode (last episode was 1 week ago). Pt notes that his wife witnessed his last syncope episode. Pt notes hx of these sx in the past x 1 yr since his father painted the house. Pt states that he saw his PMD for these sx in the past, and he has prescribed pt a Z-pac, Advair, and Prednisone but sxs always returned. finished his Prednisone last week.   , Aspergillus antibodies and Aspergillus IGE/IG.  ANCA was all sent to the lab and are pending    Sputum cx, strongyloides antibody, stool o/p (eval for helminth infection during camping) sent. Pt found with  peripheral eosinophilia, IgE nearly 4000 tree in bud opacities on CT chest-possible bronchial dilation suggesting ABPA. Prednisone 40 mg, Symbicort and Hycodan started, and while pt was in hospital was treated with Levoquin till infectious etiology ruled out by sputum culture. ENT examined pt -exam/sxs consistent with postnasal drip likely 2/2 allergies, recommended Flonase, loratidine, and nasal ocean spray.     Pt will now be discharged home with an appointment at pulmonary clinic for follow up. He will be discharged on prolonged steroid taper. 28M w/o sig PMHx, BIB parents, arrives to the ED c/o a cough with associated dizziness for 1 week. Pt reports that these sx have led him experience a syncope episode (last episode was 1 week ago). Pt notes that his wife witnessed his last syncope episode. Pt notes hx of these sx in the past x 1 yr since his father painted the house. Pt states that he saw his PMD for these sx in the past, and he has prescribed pt a Z-pac, Advair, and Prednisone but sxs always returned. finished his Prednisone last week.   , Aspergillus antibodies and Aspergillus IGE/IG.  ANCA was all sent to the lab and are pending    Sputum cx, strongyloides antibody, stool o/p (eval for helminth infection during camping) sent. Pt found with  peripheral eosinophilia, IgE nearly 4000 tree in bud opacities on CT chest-possible bronchial dilation. Pt was diagnosed with eosinophilic asthma. Prednisone 40 mg, Symbicort and Hycodan started, and while pt was in hospital was treated with Levaquin ENT examined pt -exam/sxs consistent with postnasal drip likely 2/2 allergies, recommended Flonase, loratidine, and nasal ocean spray.     Pt is discharged home with an appointment at pulmonary clinic for follow up. He will be discharged on prolonged steroid taper with instruction by pulmonologist as outpatient.

## 2018-05-30 NOTE — PROGRESS NOTE ADULT - SUBJECTIVE AND OBJECTIVE BOX
Interval Events:  Increased prednisone from 40 to 60 mg daily due to degree of symptoms  Reports continued improvement although still with cough  Awaiting further lab results    REVIEW OF SYSTEMS:  [x] All other systems negative  [ ] Unable to assess ROS because ________    OBJECTIVE:  ICU Vital Signs Last 24 Hrs  T(C): 36.7 (30 May 2018 05:41), Max: 37 (29 May 2018 12:30)  T(F): 98 (30 May 2018 05:41), Max: 98.6 (29 May 2018 12:30)  HR: 88 (30 May 2018 07:43) (73 - 97)  BP: 105/63 (30 May 2018 05:41) (101/55 - 126/72)  BP(mean): --  ABP: --  ABP(mean): --  RR: 16 (30 May 2018 05:41) (16 - 16)  SpO2: 97% (30 May 2018 07:43) (94% - 100%)        05-29 @ 07:01  -  05-30 @ 07:00  --------------------------------------------------------  IN: 240 mL / OUT: 0 mL / NET: 240 mL      CAPILLARY BLOOD GLUCOSE          PHYSICAL EXAM:  General: NAD  Neck: Supple, no JVD  Respiratory: Improvement although still with expiratory wheezing. No use of accessory muscles.  Cardiovascular: RRR, no murmur, no edema  Abdomen: Soft, nontender  Skin: Warm and Intact  Neurological: AOx3      HOSPITAL MEDICATIONS:    levoFLOXacin  Tablet 500 milliGRAM(s) Oral daily      predniSONE   Tablet 60 milliGRAM(s) Oral daily    buDESOnide 160 MICROgram(s)/formoterol 4.5 MICROgram(s) Inhaler 2 Puff(s) Inhalation two times a day  HYDROcodone/homatropine Syrup 5 milliLiter(s) Oral every 4 hours PRN  levalbuterol Inhalation 0.63 milliGRAM(s) Inhalation every 4 hours  loratadine 10 milliGRAM(s) Oral daily    acetaminophen   Tablet. 650 milliGRAM(s) Oral every 6 hours PRN    famotidine    Tablet 20 milliGRAM(s) Oral two times a day            fluticasone propionate 50 MICROgram(s)/spray Nasal Spray 1 Spray(s) Both Nostrils two times a day  sodium chloride 0.65% Nasal 1 Spray(s) Both Nostrils three times a day        LABS:                        14.2   15.93 )-----------( 290      ( 30 May 2018 06:15 )             41.7     Hgb Trend: 14.2<--, 14.4<--, 14.2<--, 14.3<--, 16.3<--  05-30    136  |  96<L>  |  16  ----------------------------<  101<H>  3.9   |  24  |  1.10    Ca    8.6      30 May 2018 06:15  Mg     2.1     05-30      Creatinine Trend: 1.10<--, 1.18<--, 1.21<--, 1.12<--, 1.30<--            MICROBIOLOGY:     RADIOLOGY:  [ ] Reviewed and interpreted by me      ASSESSMENT AND RECOMMENDATION    28 M cough and asthma like symptoms associated with eosinophilia and tree-in-bud on CT chest. ENT exam revealed no edema and widely patent airway. No mention of the presence of polyps. Sputum culture with polymicrobial gram stain, awaiting culture. IgE nearly 4000.    Follow up aspergillus antibodies, strongyloides, and ANCA  Stool O/P  Continue prednisone 60 mg daily  Duonebs Q6H, Hyocodan for cough PRN  Symbicort for maintenance   If infectious work up negative will need prolonged steroid taper  Requesting to follow up with pulmonary clinic here - will need outpatient PFTs (per patient he has never had any done)    Marky Benavides MD  Pulmonary and Critical Care Fellow  [phone]

## 2018-05-30 NOTE — DISCHARGE NOTE ADULT - CARE PLAN
Principal Discharge DX:	Tussive syncope  Goal:	determine underlying cause of cough  Assessment and plan of treatment:	Several labs were sent to look for a cause for your cough. In the meantime you are being discharged on Prednisone 40 mg till you follow up with the pulmonary clinic in one week. Further recommendations will be made at the clinic. Principal Discharge DX:	Tussive syncope  Goal:	determine underlying cause of cough  Assessment and plan of treatment:	Several labs were sent to look for a cause for your cough. In the meantime you are being discharged on Prednisone 40 mg till you follow up with the pulmonary clinic in one week. An appointment will be made for you. Further recommendations will be made at the clinic.

## 2018-05-30 NOTE — DISCHARGE NOTE ADULT - MEDICATION SUMMARY - MEDICATIONS TO TAKE
I will START or STAY ON the medications listed below when I get home from the hospital:    Deltasone 20 mg oral tablet  -- 2 tab(s) by mouth once a day   -- It is very important that you take or use this exactly as directed.  Do not skip doses or discontinue unless directed by your doctor.  Obtain medical advice before taking any non-prescription drugs as some may affect the action of this medication.  Take with food or milk.    -- Indication: For Cough    loratadine 10 mg oral tablet  -- 1 tab(s) by mouth once a day  -- Indication: For Cough    ProAir HFA 90 mcg/inh inhalation aerosol  -- 2 puff(s) inhaled every 6 hours, As Needed  -- Indication: For Wheezing    budesonide-formoterol 160 mcg-4.5 mcg/inh inhalation aerosol  -- 2 puff(s) inhaled 2 times a day   -- Indication: For Wheezing    famotidine 20 mg oral tablet  -- 1 tab(s) by mouth 2 times a day  -- Indication: For Acid reflux    sodium chloride 0.65% nasal spray  -- 1 spray(s) into nose 3 times a day   -- Indication: For Cough    fluticasone 50 mcg/inh nasal spray  -- 1 spray(s) into nose 2 times a day  -- Indication: For Cough    HYDROcodone-homatropine 5 mg-1.5 mg/5 mL oral syrup  -- 5 milliliter(s) by mouth every 4 hours, As needed, Cough MDD:30 mL  -- Indication: For Cough

## 2018-05-30 NOTE — PROGRESS NOTE ADULT - ATTENDING COMMENTS
Patient markedly improved today with less coughing, wheezing, Will maintain on 40mg Prednisone and follow as outpatient.
Patient with severe asthma, concern for vasculitis or ABPA in view of severe eosinophilia. Workup in progress.
d/c planning if OK with pulm. Time 40 min

## 2018-05-30 NOTE — PROGRESS NOTE ADULT - PROBLEM SELECTOR PLAN 2
CT chest revealed scattered bilateral tree-in-bud opacities, likely secondary to mucoid or debris impacted distal airways. CXR negative. Noted eosinophilia (4.74k/ul) and high titer of IgE   continue prednisone PO, c/w symbicort  - f/u pulmonary recs  - outpatient PFTs

## 2018-05-30 NOTE — DISCHARGE NOTE ADULT - PROVIDER TOKENS
FREE:[LAST:[Pulmonary Clinic at [facility]],PHONE:[phone]],FAX:[(   )    -]]

## 2018-05-30 NOTE — DISCHARGE NOTE ADULT - MEDICATION SUMMARY - MEDICATIONS TO STOP TAKING
I will STOP taking the medications listed below when I get home from the hospital:    Advair  mcg-21 mcg/inh inhalation aerosol  -- 2 puff(s) inhaled 2 times a day    Afrin 0.05% nasal spray  -- 2 spray(s) into nose 2 times a day

## 2018-05-30 NOTE — PROGRESS NOTE ADULT - PROBLEM SELECTOR PLAN 3
- Patient said that he has severe coughing fits with resultant syncope after - highly suspicious for tussive syncope, c/w hycodan to help reduce the severity of his cough.

## 2018-05-30 NOTE — DISCHARGE NOTE ADULT - PATIENT PORTAL LINK FT
You can access the [facility] Patient Portal, offered by [facility], by registering with the following website: [email or web address]

## 2018-05-30 NOTE — DISCHARGE NOTE ADULT - PLAN OF CARE
determine underlying cause of cough Several labs were sent to look for a cause for your cough. In the meantime you are being discharged on Prednisone 40 mg till you follow up with the pulmonary clinic in one week. Further recommendations will be made at the clinic. Several labs were sent to look for a cause for your cough. In the meantime you are being discharged on Prednisone 40 mg till you follow up with the pulmonary clinic in one week. An appointment will be made for you. Further recommendations will be made at the clinic.

## 2018-05-31 PROBLEM — Z00.00 ENCOUNTER FOR PREVENTIVE HEALTH EXAMINATION: Status: ACTIVE | Noted: 2018-05-31

## 2018-06-05 LAB
A NIGER AB FLD QL: SIGNIFICANT CHANGE UP
A NIGER AB FLD QL: SIGNIFICANT CHANGE UP

## 2018-06-06 ENCOUNTER — APPOINTMENT (OUTPATIENT)
Dept: PULMONOLOGY | Facility: CLINIC | Age: 28
End: 2018-06-06
Payer: COMMERCIAL

## 2018-06-06 VITALS
HEART RATE: 83 BPM | WEIGHT: 165 LBS | TEMPERATURE: 98.1 F | DIASTOLIC BLOOD PRESSURE: 70 MMHG | BODY MASS INDEX: 22.35 KG/M2 | HEIGHT: 72 IN | OXYGEN SATURATION: 98 % | SYSTOLIC BLOOD PRESSURE: 110 MMHG | RESPIRATION RATE: 18 BRPM

## 2018-06-06 DIAGNOSIS — Z82.5 FAMILY HISTORY OF ASTHMA AND OTHER CHRONIC LOWER RESPIRATORY DISEASES: ICD-10-CM

## 2018-06-06 PROCEDURE — 99214 OFFICE O/P EST MOD 30 MIN: CPT

## 2018-07-16 ENCOUNTER — APPOINTMENT (OUTPATIENT)
Dept: PULMONOLOGY | Facility: CLINIC | Age: 28
End: 2018-07-16
Payer: COMMERCIAL

## 2018-07-16 ENCOUNTER — LABORATORY RESULT (OUTPATIENT)
Age: 28
End: 2018-07-16

## 2018-07-16 VITALS
BODY MASS INDEX: 23.3 KG/M2 | HEART RATE: 80 BPM | TEMPERATURE: 98 F | DIASTOLIC BLOOD PRESSURE: 80 MMHG | WEIGHT: 172 LBS | RESPIRATION RATE: 16 BRPM | SYSTOLIC BLOOD PRESSURE: 120 MMHG | HEIGHT: 72 IN

## 2018-07-16 PROCEDURE — 99214 OFFICE O/P EST MOD 30 MIN: CPT

## 2018-07-17 LAB
BASOPHILS # BLD AUTO: 0.1 K/UL
BASOPHILS NFR BLD AUTO: 0.8 %
C3 SERPL-MCNC: 144 MG/DL
C4 SERPL-MCNC: 40 MG/DL
EOSINOPHIL # BLD AUTO: 2.3 K/UL
EOSINOPHIL NFR BLD AUTO: 18 %
HCT VFR BLD CALC: 47.6 %
HGB BLD-MCNC: 15.7 G/DL
LYMPHOCYTES # BLD AUTO: 3.79 K/UL
LYMPHOCYTES NFR BLD AUTO: 29.7 %
MAN DIFF?: NORMAL
MCHC RBC-ENTMCNC: 29.7 PG
MCHC RBC-ENTMCNC: 33 GM/DL
MCV RBC AUTO: 90.2 FL
MONOCYTES # BLD AUTO: 0.5 K/UL
MONOCYTES NFR BLD AUTO: 3.9 %
NEUTROPHILS # BLD AUTO: 5.97 K/UL
NEUTROPHILS NFR BLD AUTO: 46.8 %
PLATELET # BLD AUTO: 290 K/UL
RBC # BLD: 5.28 M/UL
RBC # FLD: 15.7 %
RHEUMATOID FACT SER QL: 23 IU/ML
WBC # FLD AUTO: 12.76 K/UL

## 2018-07-18 LAB
ENA SCL70 IGG SER IA-ACNC: <0.2 AL
ENA SS-A AB SER IA-ACNC: <0.2 AL
ENA SS-B AB SER IA-ACNC: <0.2 AL
MPO AB + PR3 PNL SER: NORMAL

## 2018-07-19 LAB
ANA SER IF-ACNC: NEGATIVE
TOTAL IGE SMQN RAST: 2983 KU/L

## 2018-07-20 ENCOUNTER — MEDICATION RENEWAL (OUTPATIENT)
Age: 28
End: 2018-07-20

## 2018-08-01 ENCOUNTER — APPOINTMENT (OUTPATIENT)
Dept: PULMONOLOGY | Facility: CLINIC | Age: 28
End: 2018-08-01
Payer: COMMERCIAL

## 2018-08-01 PROCEDURE — 94729 DIFFUSING CAPACITY: CPT

## 2018-08-01 PROCEDURE — 94060 EVALUATION OF WHEEZING: CPT

## 2018-08-01 PROCEDURE — 94726 PLETHYSMOGRAPHY LUNG VOLUMES: CPT

## 2018-08-09 RX ORDER — BENRALIZUMAB 30 MG/ML
30 INJECTION, SOLUTION SUBCUTANEOUS
Qty: 1 | Refills: 11 | Status: ACTIVE | COMMUNITY
Start: 2018-07-20

## 2018-08-15 ENCOUNTER — APPOINTMENT (OUTPATIENT)
Dept: PULMONOLOGY | Facility: CLINIC | Age: 28
End: 2018-08-15
Payer: COMMERCIAL

## 2018-08-15 VITALS
DIASTOLIC BLOOD PRESSURE: 80 MMHG | WEIGHT: 169 LBS | RESPIRATION RATE: 16 BRPM | HEART RATE: 78 BPM | OXYGEN SATURATION: 96 % | SYSTOLIC BLOOD PRESSURE: 110 MMHG | HEIGHT: 72 IN | TEMPERATURE: 98.2 F | BODY MASS INDEX: 22.89 KG/M2

## 2018-08-15 PROCEDURE — 99214 OFFICE O/P EST MOD 30 MIN: CPT

## 2018-08-15 RX ORDER — PREDNISONE 10 MG/1
10 TABLET ORAL
Qty: 100 | Refills: 11 | Status: ACTIVE | COMMUNITY
Start: 2018-06-06 | End: 1900-01-01

## 2018-08-15 RX ORDER — HYDROCODONE BITARTRATE AND HOMATROPINE METHYLBROMIDE 5; 1.5 MG/5ML; MG/5ML
5-1.5 SYRUP ORAL EVERY 6 HOURS
Qty: 280 | Refills: 0 | Status: ACTIVE | COMMUNITY
Start: 2018-08-15 | End: 1900-01-01

## 2018-08-16 ENCOUNTER — MEDICATION RENEWAL (OUTPATIENT)
Age: 28
End: 2018-08-16

## 2018-08-16 RX ORDER — EPINEPHRINE 0.3 MG/.3ML
0.3 INJECTION INTRAMUSCULAR
Qty: 1 | Refills: 3 | Status: ACTIVE | COMMUNITY
Start: 2018-08-16 | End: 1900-01-01

## 2018-08-20 ENCOUNTER — APPOINTMENT (OUTPATIENT)
Dept: PULMONOLOGY | Facility: CLINIC | Age: 28
End: 2018-08-20
Payer: COMMERCIAL

## 2018-08-20 VITALS
HEART RATE: 75 BPM | SYSTOLIC BLOOD PRESSURE: 120 MMHG | WEIGHT: 169 LBS | HEIGHT: 72 IN | TEMPERATURE: 97.7 F | DIASTOLIC BLOOD PRESSURE: 80 MMHG | RESPIRATION RATE: 16 BRPM | BODY MASS INDEX: 22.89 KG/M2

## 2018-08-20 PROCEDURE — 96401 CHEMO ANTI-NEOPL SQ/IM: CPT

## 2018-09-18 ENCOUNTER — APPOINTMENT (OUTPATIENT)
Dept: PULMONOLOGY | Facility: CLINIC | Age: 28
End: 2018-09-18
Payer: COMMERCIAL

## 2018-09-18 VITALS
OXYGEN SATURATION: 98 % | WEIGHT: 168 LBS | SYSTOLIC BLOOD PRESSURE: 98 MMHG | BODY MASS INDEX: 22.75 KG/M2 | DIASTOLIC BLOOD PRESSURE: 60 MMHG | TEMPERATURE: 97.6 F | RESPIRATION RATE: 15 BRPM | HEART RATE: 66 BPM | HEIGHT: 72 IN

## 2018-09-18 PROCEDURE — 96401 CHEMO ANTI-NEOPL SQ/IM: CPT

## 2018-09-18 RX ORDER — BENRALIZUMAB 30 MG/ML
30 INJECTION, SOLUTION SUBCUTANEOUS
Refills: 0 | Status: COMPLETED | OUTPATIENT
Start: 2018-09-18

## 2018-09-18 RX ADMIN — BENRALIZUMAB 0 MG/ML: 30 INJECTION, SOLUTION SUBCUTANEOUS at 00:00

## 2018-09-20 LAB
M TB IFN-G BLD-IMP: NEGATIVE
QUANTIFERON TB PLUS MITOGEN MINUS NIL: 7.59 IU/ML
QUANTIFERON TB PLUS NIL: 0.08 IU/ML
QUANTIFERON TB PLUS TB1 MINUS NIL: -0.04 IU/ML
QUANTIFERON TB PLUS TB2 MINUS NIL: -0.04 IU/ML

## 2018-09-24 ENCOUNTER — APPOINTMENT (OUTPATIENT)
Dept: RHEUMATOLOGY | Facility: CLINIC | Age: 28
End: 2018-09-24

## 2018-10-16 ENCOUNTER — APPOINTMENT (OUTPATIENT)
Dept: PULMONOLOGY | Facility: CLINIC | Age: 28
End: 2018-10-16

## 2018-10-30 ENCOUNTER — APPOINTMENT (OUTPATIENT)
Dept: PULMONOLOGY | Facility: CLINIC | Age: 28
End: 2018-10-30
Payer: COMMERCIAL

## 2018-10-30 VITALS
TEMPERATURE: 98.2 F | WEIGHT: 168 LBS | SYSTOLIC BLOOD PRESSURE: 101 MMHG | DIASTOLIC BLOOD PRESSURE: 67 MMHG | RESPIRATION RATE: 15 BRPM | BODY MASS INDEX: 22.75 KG/M2 | HEART RATE: 68 BPM | HEIGHT: 72 IN

## 2018-10-30 PROCEDURE — 99214 OFFICE O/P EST MOD 30 MIN: CPT | Mod: 25

## 2018-10-30 PROCEDURE — 96401 CHEMO ANTI-NEOPL SQ/IM: CPT

## 2018-10-30 PROCEDURE — ZZZZZ: CPT

## 2018-10-30 RX ORDER — BENRALIZUMAB 30 MG/ML
30 INJECTION, SOLUTION SUBCUTANEOUS
Refills: 0 | Status: COMPLETED | OUTPATIENT
Start: 2018-10-30

## 2018-10-30 RX ADMIN — BENRALIZUMAB 0 MG/ML: 30 INJECTION, SOLUTION SUBCUTANEOUS at 00:00

## 2018-12-28 ENCOUNTER — APPOINTMENT (OUTPATIENT)
Dept: PULMONOLOGY | Facility: CLINIC | Age: 28
End: 2018-12-28

## 2019-01-02 ENCOUNTER — APPOINTMENT (OUTPATIENT)
Dept: PULMONOLOGY | Facility: CLINIC | Age: 29
End: 2019-01-02
Payer: COMMERCIAL

## 2019-01-02 VITALS
WEIGHT: 168 LBS | SYSTOLIC BLOOD PRESSURE: 111 MMHG | RESPIRATION RATE: 16 BRPM | DIASTOLIC BLOOD PRESSURE: 76 MMHG | BODY MASS INDEX: 22.75 KG/M2 | HEIGHT: 72 IN | TEMPERATURE: 98.8 F | HEART RATE: 95 BPM | OXYGEN SATURATION: 95 %

## 2019-01-02 DIAGNOSIS — Z87.09 PERSONAL HISTORY OF OTHER DISEASES OF THE RESPIRATORY SYSTEM: ICD-10-CM

## 2019-01-02 DIAGNOSIS — R50.9 FEVER, UNSPECIFIED: ICD-10-CM

## 2019-01-02 DIAGNOSIS — R76.11 NONSPECIFIC REACTION TO TUBERCULIN SKIN TEST W/OUT ACTIVE TUBERCULOSIS: ICD-10-CM

## 2019-01-02 DIAGNOSIS — R05 COUGH: ICD-10-CM

## 2019-01-02 PROCEDURE — 99214 OFFICE O/P EST MOD 30 MIN: CPT | Mod: 25

## 2019-01-02 PROCEDURE — 96401 CHEMO ANTI-NEOPL SQ/IM: CPT

## 2019-01-02 RX ORDER — ALBUTEROL SULFATE 5 MG/ML
(5 MG/ML) SOLUTION, NON-ORAL INHALATION
Qty: 2 | Refills: 3 | Status: ACTIVE | COMMUNITY
Start: 2019-01-02 | End: 1900-01-01

## 2019-01-02 RX ORDER — OSELTAMIVIR PHOSPHATE 75 MG/1
75 CAPSULE ORAL TWICE DAILY
Qty: 1 | Refills: 0 | Status: ACTIVE | COMMUNITY
Start: 2019-01-02 | End: 1900-01-01

## 2019-01-02 RX ORDER — BALOXAVIR MARBOXIL 40 MG/1
2 X 40 TABLET, FILM COATED ORAL
Qty: 1 | Refills: 0 | Status: DISCONTINUED | COMMUNITY
Start: 2019-01-02 | End: 2019-01-02

## 2019-01-02 RX ADMIN — BENRALIZUMAB 30 MG/ML: 30 INJECTION, SOLUTION SUBCUTANEOUS at 00:00

## 2019-01-03 LAB
FLUAV H3 RNA SPEC QL NAA+PROBE: DETECTED
RAPID RVP RESULT: DETECTED

## 2019-01-08 ENCOUNTER — APPOINTMENT (OUTPATIENT)
Dept: PULMONOLOGY | Facility: CLINIC | Age: 29
End: 2019-01-08

## 2019-01-21 PROBLEM — R76.11 POSITIVE SKIN TEST FOR TUBERCULOSIS: Status: ACTIVE | Noted: 2018-09-18

## 2019-02-18 ENCOUNTER — INPATIENT (INPATIENT)
Facility: HOSPITAL | Age: 29
LOS: 3 days | Discharge: HOME | End: 2019-02-22
Attending: HOSPITALIST | Admitting: HOSPITALIST
Payer: COMMERCIAL

## 2019-02-18 VITALS
DIASTOLIC BLOOD PRESSURE: 80 MMHG | RESPIRATION RATE: 30 BRPM | TEMPERATURE: 98 F | HEART RATE: 117 BPM | OXYGEN SATURATION: 93 % | SYSTOLIC BLOOD PRESSURE: 125 MMHG

## 2019-02-18 LAB
ALBUMIN SERPL ELPH-MCNC: 4.5 G/DL — SIGNIFICANT CHANGE UP (ref 3.3–5)
ALP SERPL-CCNC: 86 U/L — SIGNIFICANT CHANGE UP (ref 40–120)
ALT FLD-CCNC: 27 U/L — SIGNIFICANT CHANGE UP (ref 4–41)
ANION GAP SERPL CALC-SCNC: 14 MMO/L — SIGNIFICANT CHANGE UP (ref 7–14)
AST SERPL-CCNC: 25 U/L — SIGNIFICANT CHANGE UP (ref 4–40)
BASE EXCESS BLDV CALC-SCNC: 1.9 MMOL/L — SIGNIFICANT CHANGE UP
BASOPHILS # BLD AUTO: 0.22 K/UL — HIGH (ref 0–0.2)
BASOPHILS NFR BLD AUTO: 1.4 % — SIGNIFICANT CHANGE UP (ref 0–2)
BILIRUB SERPL-MCNC: 0.7 MG/DL — SIGNIFICANT CHANGE UP (ref 0.2–1.2)
BLOOD GAS VENOUS - CREATININE: 1.1 MG/DL — SIGNIFICANT CHANGE UP (ref 0.5–1.3)
BUN SERPL-MCNC: 13 MG/DL — SIGNIFICANT CHANGE UP (ref 7–23)
CALCIUM SERPL-MCNC: 9.4 MG/DL — SIGNIFICANT CHANGE UP (ref 8.4–10.5)
CHLORIDE BLDV-SCNC: 106 MMOL/L — SIGNIFICANT CHANGE UP (ref 96–108)
CHLORIDE SERPL-SCNC: 102 MMOL/L — SIGNIFICANT CHANGE UP (ref 98–107)
CO2 SERPL-SCNC: 24 MMOL/L — SIGNIFICANT CHANGE UP (ref 22–31)
CREAT SERPL-MCNC: 1.12 MG/DL — SIGNIFICANT CHANGE UP (ref 0.5–1.3)
EOSINOPHIL # BLD AUTO: 3.64 K/UL — HIGH (ref 0–0.5)
EOSINOPHIL NFR BLD AUTO: 23.8 % — HIGH (ref 0–6)
FLU A RESULT: NOT DETECTED — SIGNIFICANT CHANGE UP
FLU A RESULT: NOT DETECTED — SIGNIFICANT CHANGE UP
FLUAV AG NPH QL: NOT DETECTED — SIGNIFICANT CHANGE UP
FLUBV AG NPH QL: NOT DETECTED — SIGNIFICANT CHANGE UP
GAS PNL BLDV: 139 MMOL/L — SIGNIFICANT CHANGE UP (ref 136–146)
GLUCOSE BLDV-MCNC: 98 — SIGNIFICANT CHANGE UP (ref 70–99)
GLUCOSE SERPL-MCNC: 98 MG/DL — SIGNIFICANT CHANGE UP (ref 70–99)
HCO3 BLDV-SCNC: 23 MMOL/L — SIGNIFICANT CHANGE UP (ref 20–27)
HCT VFR BLD CALC: 49.2 % — SIGNIFICANT CHANGE UP (ref 39–50)
HCT VFR BLDV CALC: 50.9 % — SIGNIFICANT CHANGE UP (ref 39–51)
HGB BLD-MCNC: 16 G/DL — SIGNIFICANT CHANGE UP (ref 13–17)
HGB BLDV-MCNC: 16.6 G/DL — SIGNIFICANT CHANGE UP (ref 13–17)
IMM GRANULOCYTES NFR BLD AUTO: 0.3 % — SIGNIFICANT CHANGE UP (ref 0–1.5)
LACTATE BLDV-MCNC: 2 MMOL/L — SIGNIFICANT CHANGE UP (ref 0.5–2)
LYMPHOCYTES # BLD AUTO: 16.4 % — SIGNIFICANT CHANGE UP (ref 13–44)
LYMPHOCYTES # BLD AUTO: 2.51 K/UL — SIGNIFICANT CHANGE UP (ref 1–3.3)
MCHC RBC-ENTMCNC: 27.5 PG — SIGNIFICANT CHANGE UP (ref 27–34)
MCHC RBC-ENTMCNC: 32.5 % — SIGNIFICANT CHANGE UP (ref 32–36)
MCV RBC AUTO: 84.5 FL — SIGNIFICANT CHANGE UP (ref 80–100)
MONOCYTES # BLD AUTO: 0.87 K/UL — SIGNIFICANT CHANGE UP (ref 0–0.9)
MONOCYTES NFR BLD AUTO: 5.7 % — SIGNIFICANT CHANGE UP (ref 2–14)
NEUTROPHILS # BLD AUTO: 8 K/UL — HIGH (ref 1.8–7.4)
NEUTROPHILS NFR BLD AUTO: 52.4 % — SIGNIFICANT CHANGE UP (ref 43–77)
NRBC # FLD: 0 K/UL — LOW (ref 25–125)
PCO2 BLDV: 53 MMHG — HIGH (ref 41–51)
PH BLDV: 7.33 PH — SIGNIFICANT CHANGE UP (ref 7.32–7.43)
PLATELET # BLD AUTO: 308 K/UL — SIGNIFICANT CHANGE UP (ref 150–400)
PMV BLD: 10 FL — SIGNIFICANT CHANGE UP (ref 7–13)
PO2 BLDV: 27 MMHG — LOW (ref 35–40)
POTASSIUM BLDV-SCNC: 4 MMOL/L — SIGNIFICANT CHANGE UP (ref 3.4–4.5)
POTASSIUM SERPL-MCNC: 4.1 MMOL/L — SIGNIFICANT CHANGE UP (ref 3.5–5.3)
POTASSIUM SERPL-SCNC: 4.1 MMOL/L — SIGNIFICANT CHANGE UP (ref 3.5–5.3)
PROT SERPL-MCNC: 7.8 G/DL — SIGNIFICANT CHANGE UP (ref 6–8.3)
RBC # BLD: 5.82 M/UL — HIGH (ref 4.2–5.8)
RBC # FLD: 14.1 % — SIGNIFICANT CHANGE UP (ref 10.3–14.5)
RSV RESULT: SIGNIFICANT CHANGE UP
RSV RNA RESP QL NAA+PROBE: SIGNIFICANT CHANGE UP
SAO2 % BLDV: 41.9 % — LOW (ref 60–85)
SODIUM SERPL-SCNC: 140 MMOL/L — SIGNIFICANT CHANGE UP (ref 135–145)
WBC # BLD: 15.29 K/UL — HIGH (ref 3.8–10.5)
WBC # FLD AUTO: 15.29 K/UL — HIGH (ref 3.8–10.5)

## 2019-02-18 PROCEDURE — 71046 X-RAY EXAM CHEST 2 VIEWS: CPT | Mod: 26

## 2019-02-18 RX ORDER — MAGNESIUM SULFATE 500 MG/ML
2 VIAL (ML) INJECTION ONCE
Qty: 0 | Refills: 0 | Status: COMPLETED | OUTPATIENT
Start: 2019-02-18 | End: 2019-02-18

## 2019-02-18 RX ORDER — IPRATROPIUM/ALBUTEROL SULFATE 18-103MCG
3 AEROSOL WITH ADAPTER (GRAM) INHALATION ONCE
Qty: 0 | Refills: 0 | Status: COMPLETED | OUTPATIENT
Start: 2019-02-18 | End: 2019-02-18

## 2019-02-18 RX ORDER — SODIUM CHLORIDE 9 MG/ML
1000 INJECTION INTRAMUSCULAR; INTRAVENOUS; SUBCUTANEOUS ONCE
Qty: 0 | Refills: 0 | Status: COMPLETED | OUTPATIENT
Start: 2019-02-18 | End: 2019-02-18

## 2019-02-18 RX ORDER — IPRATROPIUM/ALBUTEROL SULFATE 18-103MCG
3 AEROSOL WITH ADAPTER (GRAM) INHALATION
Qty: 0 | Refills: 0 | Status: COMPLETED | OUTPATIENT
Start: 2019-02-18 | End: 2019-02-18

## 2019-02-18 RX ADMIN — Medication 50 GRAM(S): at 23:08

## 2019-02-18 RX ADMIN — Medication 3 MILLILITER(S): at 22:01

## 2019-02-18 RX ADMIN — Medication 3 MILLILITER(S): at 22:13

## 2019-02-18 RX ADMIN — Medication 60 MILLIGRAM(S): at 22:01

## 2019-02-18 RX ADMIN — Medication 3 MILLILITER(S): at 22:12

## 2019-02-18 RX ADMIN — Medication 3 MILLILITER(S): at 19:56

## 2019-02-18 RX ADMIN — SODIUM CHLORIDE 1000 MILLILITER(S): 9 INJECTION INTRAMUSCULAR; INTRAVENOUS; SUBCUTANEOUS at 22:57

## 2019-02-18 NOTE — ED PROVIDER NOTE - CLINICAL SUMMARY MEDICAL DECISION MAKING FREE TEXT BOX
asthma exacerbation. r/o PNA, PTX. pt required emergent eval, nebs, prednisone. will get labs, including VBG, CXR, likely admit vs CDU.

## 2019-02-18 NOTE — ED ADULT NURSE NOTE - OBJECTIVE STATEMENT
pt received in intake A&OX3 c/o asthma exacerbation. Pt p/w worsening productive cough, dyspnea x 2 weeks. Pt endorses subjective fevers, states when he gets into coughing fits he sometimes passes out. Denies any intubations in the past. Pt has wheezes heard in bilateral lung sounds. Pt is tachypneic. pt is slightly tachycardic at this time s/p neb treatments. Pt does state some relief with neb treatments. O2 sat 97% on room air. Labs drawn and sent. Will continue to monitor.

## 2019-02-18 NOTE — ED ADULT TRIAGE NOTE - CHIEF COMPLAINT QUOTE
hx of eosinophillic asthma sees Dr. Parish has been c/o SOB increased WOB took 2 nebulizers today with no relief. LS tight in all fields.

## 2019-02-18 NOTE — ED PROVIDER NOTE - PROGRESS NOTE DETAILS
Pt still wheezing throughout. PCO2 54 after 3 rounds of nebs. will give Mag. O2 sat 96%RA, tachypneic to 26. Tachy, likely 2/2 albuterol, possibly dehydration, will treat with IVFs. Will admit. paged pulm fellow second time. awaiting call back. paged pulm fellow, Dr Moscoso, for a third time. Also attempted calling pulm attending, but no answer. spoke to pulm fellow. PCU is full. pt feels improved, still wheezing. will admit to hospitalist service. spoke to hospitalist (Dr. Wagner), ok to admit.

## 2019-02-18 NOTE — ED PROVIDER NOTE - PHYSICAL EXAMINATION
GEN - NAD; well appearing; A+O x3   HEAD - NC/AT     EYES - EOMI, no conjunctival pallor, no scleral icterus  ENT -   mucous membranes  moist , no discharge      NECK - Neck supple  PULM - diffuse wheezing, mildly tachypneic, speaking in full sentences  COR -  RRR, S1 S2, no murmurs  ABD - , ND, NT, soft, no guarding, no rebound, no masses    BACK - no CVA tenderness, nontender spine     EXTREMS - no edema, no deformity, warm and well perfused    SKIN - no rash or bruising      NEUROLOGIC - alert, sensation nl, motor 5/5 RUE/LUE/RLE/LLE

## 2019-02-19 ENCOUNTER — TRANSCRIPTION ENCOUNTER (OUTPATIENT)
Age: 29
End: 2019-02-19

## 2019-02-19 DIAGNOSIS — J45.51 SEVERE PERSISTENT ASTHMA WITH (ACUTE) EXACERBATION: ICD-10-CM

## 2019-02-19 DIAGNOSIS — Z29.9 ENCOUNTER FOR PROPHYLACTIC MEASURES, UNSPECIFIED: ICD-10-CM

## 2019-02-19 DIAGNOSIS — D72.1 EOSINOPHILIA: ICD-10-CM

## 2019-02-19 DIAGNOSIS — R65.10 SYSTEMIC INFLAMMATORY RESPONSE SYNDROME (SIRS) OF NON-INFECTIOUS ORIGIN WITHOUT ACUTE ORGAN DYSFUNCTION: ICD-10-CM

## 2019-02-19 DIAGNOSIS — J45.41 MODERATE PERSISTENT ASTHMA WITH (ACUTE) EXACERBATION: ICD-10-CM

## 2019-02-19 LAB
ANION GAP SERPL CALC-SCNC: 15 MMO/L — HIGH (ref 7–14)
ANISOCYTOSIS BLD QL: SLIGHT — SIGNIFICANT CHANGE UP
B PERT DNA SPEC QL NAA+PROBE: NOT DETECTED — SIGNIFICANT CHANGE UP
BASE EXCESS BLDV CALC-SCNC: -0.4 MMOL/L — SIGNIFICANT CHANGE UP
BASE EXCESS BLDV CALC-SCNC: 1 MMOL/L — SIGNIFICANT CHANGE UP
BASOPHILS # BLD AUTO: 0.04 K/UL — SIGNIFICANT CHANGE UP (ref 0–0.2)
BASOPHILS NFR BLD AUTO: 0.3 % — SIGNIFICANT CHANGE UP (ref 0–2)
BASOPHILS NFR SPEC: 1.8 % — SIGNIFICANT CHANGE UP (ref 0–2)
BLASTS # FLD: 0 % — SIGNIFICANT CHANGE UP (ref 0–0)
BLOOD GAS VENOUS - CREATININE: 1.08 MG/DL — SIGNIFICANT CHANGE UP (ref 0.5–1.3)
BLOOD GAS VENOUS - CREATININE: 1.17 MG/DL — SIGNIFICANT CHANGE UP (ref 0.5–1.3)
BUN SERPL-MCNC: 11 MG/DL — SIGNIFICANT CHANGE UP (ref 7–23)
C PNEUM DNA SPEC QL NAA+PROBE: NOT DETECTED — SIGNIFICANT CHANGE UP
CALCIUM SERPL-MCNC: 9.9 MG/DL — SIGNIFICANT CHANGE UP (ref 8.4–10.5)
CHLORIDE BLDV-SCNC: 106 MMOL/L — SIGNIFICANT CHANGE UP (ref 96–108)
CHLORIDE BLDV-SCNC: 109 MMOL/L — HIGH (ref 96–108)
CHLORIDE SERPL-SCNC: 103 MMOL/L — SIGNIFICANT CHANGE UP (ref 98–107)
CO2 SERPL-SCNC: 22 MMOL/L — SIGNIFICANT CHANGE UP (ref 22–31)
CREAT SERPL-MCNC: 1.21 MG/DL — SIGNIFICANT CHANGE UP (ref 0.5–1.3)
EOSINOPHIL # BLD AUTO: 0.01 K/UL — SIGNIFICANT CHANGE UP (ref 0–0.5)
EOSINOPHIL NFR BLD AUTO: 0.1 % — SIGNIFICANT CHANGE UP (ref 0–6)
EOSINOPHIL NFR FLD: 17.3 % — HIGH (ref 0–6)
FLUAV H1 2009 PAND RNA SPEC QL NAA+PROBE: NOT DETECTED — SIGNIFICANT CHANGE UP
FLUAV H1 RNA SPEC QL NAA+PROBE: NOT DETECTED — SIGNIFICANT CHANGE UP
FLUAV H3 RNA SPEC QL NAA+PROBE: NOT DETECTED — SIGNIFICANT CHANGE UP
FLUAV SUBTYP SPEC NAA+PROBE: NOT DETECTED — SIGNIFICANT CHANGE UP
FLUBV RNA SPEC QL NAA+PROBE: NOT DETECTED — SIGNIFICANT CHANGE UP
GAS PNL BLDV: 139 MMOL/L — SIGNIFICANT CHANGE UP (ref 136–146)
GAS PNL BLDV: 140 MMOL/L — SIGNIFICANT CHANGE UP (ref 136–146)
GLUCOSE BLDV-MCNC: 123 — HIGH (ref 70–99)
GLUCOSE BLDV-MCNC: 146 — HIGH (ref 70–99)
GLUCOSE SERPL-MCNC: 122 MG/DL — HIGH (ref 70–99)
HADV DNA SPEC QL NAA+PROBE: NOT DETECTED — SIGNIFICANT CHANGE UP
HCO3 BLDV-SCNC: 23 MMOL/L — SIGNIFICANT CHANGE UP (ref 20–27)
HCO3 BLDV-SCNC: 24 MMOL/L — SIGNIFICANT CHANGE UP (ref 20–27)
HCOV PNL SPEC NAA+PROBE: SIGNIFICANT CHANGE UP
HCT VFR BLD CALC: 45.7 % — SIGNIFICANT CHANGE UP (ref 39–50)
HCT VFR BLDV CALC: 47.6 % — SIGNIFICANT CHANGE UP (ref 39–51)
HCT VFR BLDV CALC: 48.3 % — SIGNIFICANT CHANGE UP (ref 39–51)
HGB BLD-MCNC: 15.1 G/DL — SIGNIFICANT CHANGE UP (ref 13–17)
HGB BLDV-MCNC: 15.5 G/DL — SIGNIFICANT CHANGE UP (ref 13–17)
HGB BLDV-MCNC: 15.8 G/DL — SIGNIFICANT CHANGE UP (ref 13–17)
HMPV RNA SPEC QL NAA+PROBE: NOT DETECTED — SIGNIFICANT CHANGE UP
HPIV1 RNA SPEC QL NAA+PROBE: NOT DETECTED — SIGNIFICANT CHANGE UP
HPIV2 RNA SPEC QL NAA+PROBE: NOT DETECTED — SIGNIFICANT CHANGE UP
HPIV3 RNA SPEC QL NAA+PROBE: NOT DETECTED — SIGNIFICANT CHANGE UP
HPIV4 RNA SPEC QL NAA+PROBE: NOT DETECTED — SIGNIFICANT CHANGE UP
IMM GRANULOCYTES NFR BLD AUTO: 0.6 % — SIGNIFICANT CHANGE UP (ref 0–1.5)
LACTATE BLDV-MCNC: 2.9 MMOL/L — HIGH (ref 0.5–2)
LACTATE BLDV-MCNC: 3.4 MMOL/L — HIGH (ref 0.5–2)
LYMPHOCYTES # BLD AUTO: 1.26 K/UL — SIGNIFICANT CHANGE UP (ref 1–3.3)
LYMPHOCYTES # BLD AUTO: 10.1 % — LOW (ref 13–44)
LYMPHOCYTES NFR SPEC AUTO: 13.7 % — SIGNIFICANT CHANGE UP (ref 13–44)
MCHC RBC-ENTMCNC: 27.9 PG — SIGNIFICANT CHANGE UP (ref 27–34)
MCHC RBC-ENTMCNC: 33 % — SIGNIFICANT CHANGE UP (ref 32–36)
MCV RBC AUTO: 84.5 FL — SIGNIFICANT CHANGE UP (ref 80–100)
METAMYELOCYTES # FLD: 0 % — SIGNIFICANT CHANGE UP (ref 0–1)
MICROCYTES BLD QL: SLIGHT — SIGNIFICANT CHANGE UP
MONOCYTES # BLD AUTO: 0.55 K/UL — SIGNIFICANT CHANGE UP (ref 0–0.9)
MONOCYTES NFR BLD AUTO: 4.4 % — SIGNIFICANT CHANGE UP (ref 2–14)
MONOCYTES NFR BLD: 3.6 % — SIGNIFICANT CHANGE UP (ref 2–9)
MYELOCYTES NFR BLD: 0 % — SIGNIFICANT CHANGE UP (ref 0–0)
NEUTROPHIL AB SER-ACNC: 60.9 % — SIGNIFICANT CHANGE UP (ref 43–77)
NEUTROPHILS # BLD AUTO: 10.59 K/UL — HIGH (ref 1.8–7.4)
NEUTROPHILS NFR BLD AUTO: 84.5 % — HIGH (ref 43–77)
NEUTS BAND # BLD: 0 % — SIGNIFICANT CHANGE UP (ref 0–6)
NRBC # FLD: 0 K/UL — LOW (ref 25–125)
OTHER - HEMATOLOGY %: 0 — SIGNIFICANT CHANGE UP
PCO2 BLDV: 37 MMHG — LOW (ref 41–51)
PCO2 BLDV: 52 MMHG — HIGH (ref 41–51)
PH BLDV: 7.33 PH — SIGNIFICANT CHANGE UP (ref 7.32–7.43)
PH BLDV: 7.42 PH — SIGNIFICANT CHANGE UP (ref 7.32–7.43)
PLATELET # BLD AUTO: 295 K/UL — SIGNIFICANT CHANGE UP (ref 150–400)
PLATELET COUNT - ESTIMATE: NORMAL — SIGNIFICANT CHANGE UP
PMV BLD: 10 FL — SIGNIFICANT CHANGE UP (ref 7–13)
PO2 BLDV: 30 MMHG — LOW (ref 35–40)
PO2 BLDV: 57 MMHG — HIGH (ref 35–40)
POTASSIUM BLDV-SCNC: 3.7 MMOL/L — SIGNIFICANT CHANGE UP (ref 3.4–4.5)
POTASSIUM BLDV-SCNC: 3.8 MMOL/L — SIGNIFICANT CHANGE UP (ref 3.4–4.5)
POTASSIUM SERPL-MCNC: 4 MMOL/L — SIGNIFICANT CHANGE UP (ref 3.5–5.3)
POTASSIUM SERPL-SCNC: 4 MMOL/L — SIGNIFICANT CHANGE UP (ref 3.5–5.3)
PROMYELOCYTES # FLD: 0 % — SIGNIFICANT CHANGE UP (ref 0–0)
RBC # BLD: 5.41 M/UL — SIGNIFICANT CHANGE UP (ref 4.2–5.8)
RBC # FLD: 14.3 % — SIGNIFICANT CHANGE UP (ref 10.3–14.5)
RSV RNA SPEC QL NAA+PROBE: NOT DETECTED — SIGNIFICANT CHANGE UP
RV+EV RNA SPEC QL NAA+PROBE: NOT DETECTED — SIGNIFICANT CHANGE UP
SAO2 % BLDV: 46.9 % — LOW (ref 60–85)
SAO2 % BLDV: 89.2 % — HIGH (ref 60–85)
SMUDGE CELLS # BLD: PRESENT — SIGNIFICANT CHANGE UP
SODIUM SERPL-SCNC: 140 MMOL/L — SIGNIFICANT CHANGE UP (ref 135–145)
VARIANT LYMPHS # BLD: 2.7 % — SIGNIFICANT CHANGE UP
WBC # BLD: 12.52 K/UL — HIGH (ref 3.8–10.5)
WBC # FLD AUTO: 12.52 K/UL — HIGH (ref 3.8–10.5)

## 2019-02-19 PROCEDURE — 12345: CPT | Mod: NC

## 2019-02-19 PROCEDURE — 99222 1ST HOSP IP/OBS MODERATE 55: CPT | Mod: GC

## 2019-02-19 PROCEDURE — 99223 1ST HOSP IP/OBS HIGH 75: CPT

## 2019-02-19 RX ORDER — BENRALIZUMAB 30 MG/ML
0 INJECTION, SOLUTION SUBCUTANEOUS
Qty: 0 | Refills: 0 | COMMUNITY

## 2019-02-19 RX ORDER — IPRATROPIUM/ALBUTEROL SULFATE 18-103MCG
3 AEROSOL WITH ADAPTER (GRAM) INHALATION EVERY 6 HOURS
Qty: 0 | Refills: 0 | Status: DISCONTINUED | OUTPATIENT
Start: 2019-02-19 | End: 2019-02-19

## 2019-02-19 RX ORDER — IPRATROPIUM/ALBUTEROL SULFATE 18-103MCG
3 AEROSOL WITH ADAPTER (GRAM) INHALATION EVERY 4 HOURS
Qty: 0 | Refills: 0 | Status: DISCONTINUED | OUTPATIENT
Start: 2019-02-19 | End: 2019-02-19

## 2019-02-19 RX ORDER — BUDESONIDE AND FORMOTEROL FUMARATE DIHYDRATE 160; 4.5 UG/1; UG/1
2 AEROSOL RESPIRATORY (INHALATION)
Qty: 0 | Refills: 0 | Status: DISCONTINUED | OUTPATIENT
Start: 2019-02-19 | End: 2019-02-22

## 2019-02-19 RX ORDER — FLUTICASONE PROPIONATE 50 MCG
1 SPRAY, SUSPENSION NASAL
Qty: 0 | Refills: 0 | Status: DISCONTINUED | OUTPATIENT
Start: 2019-02-19 | End: 2019-02-22

## 2019-02-19 RX ORDER — SODIUM CHLORIDE 0.65 %
1 AEROSOL, SPRAY (ML) NASAL
Qty: 0 | Refills: 0 | Status: DISCONTINUED | OUTPATIENT
Start: 2019-02-19 | End: 2019-02-22

## 2019-02-19 RX ORDER — IPRATROPIUM/ALBUTEROL SULFATE 18-103MCG
3 AEROSOL WITH ADAPTER (GRAM) INHALATION EVERY 6 HOURS
Qty: 0 | Refills: 0 | Status: DISCONTINUED | OUTPATIENT
Start: 2019-02-19 | End: 2019-02-22

## 2019-02-19 RX ORDER — ALBUTEROL 90 UG/1
2.5 AEROSOL, METERED ORAL EVERY 4 HOURS
Qty: 0 | Refills: 0 | Status: DISCONTINUED | OUTPATIENT
Start: 2019-02-19 | End: 2019-02-22

## 2019-02-19 RX ADMIN — Medication 3 MILLILITER(S): at 05:49

## 2019-02-19 RX ADMIN — Medication 40 MILLIGRAM(S): at 06:09

## 2019-02-19 RX ADMIN — Medication 3 MILLILITER(S): at 11:08

## 2019-02-19 RX ADMIN — Medication 3 MILLILITER(S): at 03:49

## 2019-02-19 RX ADMIN — Medication 1 SPRAY(S): at 18:01

## 2019-02-19 RX ADMIN — BUDESONIDE AND FORMOTEROL FUMARATE DIHYDRATE 2 PUFF(S): 160; 4.5 AEROSOL RESPIRATORY (INHALATION) at 13:43

## 2019-02-19 RX ADMIN — Medication 40 MILLIGRAM(S): at 18:02

## 2019-02-19 RX ADMIN — Medication 3 MILLILITER(S): at 13:42

## 2019-02-19 RX ADMIN — Medication 3 MILLILITER(S): at 17:14

## 2019-02-19 RX ADMIN — BUDESONIDE AND FORMOTEROL FUMARATE DIHYDRATE 2 PUFF(S): 160; 4.5 AEROSOL RESPIRATORY (INHALATION) at 21:36

## 2019-02-19 RX ADMIN — Medication 3 MILLILITER(S): at 21:49

## 2019-02-19 NOTE — PROGRESS NOTE ADULT - ASSESSMENT
29-year-old male with severe persistent asthma, presenting from home with asthma exacerbation  < from: Xray Chest 2 Views PA/Lat (02.18.19 @ 21:40) >    INTERPRETATION:  clear lungs    Acute Asthma exc  ? cough variant syncope - c/o few sec LOC with cough

## 2019-02-19 NOTE — DISCHARGE NOTE ADULT - CARE PROVIDERS DIRECT ADDRESSES
,[email or web address],DirectAddress_Unknown

## 2019-02-19 NOTE — DISCHARGE NOTE ADULT - PLAN OF CARE
Improved with Duoneb, solumedrol.  Out patient follow up with Pulmonary Dr Tang, Please follow up with Pulmonary Dr Tang. Most likely few seconds episode of sycope sec to cough syncope- No changes on telemetry- echo -  Please follow up with Cardiology in 2 weeks- Dr Smalls prevent exacerbation

## 2019-02-19 NOTE — H&P ADULT - NSHPREVIEWOFSYSTEMS_GEN_ALL_CORE
REVIEW OF SYSTEMS:    CONSTITUTIONAL: No weakness, intermittent fevers/chills  EYES/ENT: (+)irritated throat/post-nasal drip  NECK: No pain or stiffness  RESPIRATORY: (+) cough, wheezing, No hemoptysis; (+) shortness of breath/FERNANDO  CARDIOVASCULAR: No chest pain or palpitations; No lower extremity edema  GASTROINTESTINAL: No abdominal or epigastric pain. No nausea, (+) vomiting, No hematemesis; No diarrhea or constipation.   GENITOURINARY: No dysuria, frequency or hematuria  NEUROLOGICAL: No numbness or weakness  PSYCH: No anxiety/depression  MUSCULOSKELETAL: occasional muscle cramps in bilateral lower legs  SKIN: No itching, burning, rashes, or lesions   All other review of systems is negative unless indicated above.

## 2019-02-19 NOTE — H&P ADULT - PROBLEM SELECTOR PLAN 3
SCDs for DVT ppx Meets SIRS criteria secondary to tachycardia/tachypnea and stable leukocytosis  RVP negative, f/u official read of cxr  afebrile, monitor off of abx  lactate increased in setting of nebs - trend

## 2019-02-19 NOTE — H&P ADULT - PROBLEM SELECTOR PLAN 1
c/w steroids, nebs ATC (next dose now), budesonide/formoterol, fluticasone/nasal saline   VBG with noted mild hypercarbia, will repeat VBG with AM labs  pulm consult in AM  f/u RVP (pending)  f/u official read of CXR c/w steroids, nebs ATC (next dose now), budesonide/formoterol, fluticasone/nasal saline   VBG with noted mild hypercarbia, will repeat VBG with AM labs  pulm consult in AM  RVP negative  f/u official read of CXR

## 2019-02-19 NOTE — PROGRESS NOTE ADULT - PROBLEM SELECTOR PLAN 1
c/w steroids, nebs ATC (next dose now), budesonide/formoterol, fluticasone/nasal saline   pulm consult called  RVP negative  CXR- clear

## 2019-02-19 NOTE — H&P ADULT - NSHPPHYSICALEXAM_GEN_ALL_CORE
PHYSICAL EXAM:  GENERAL: NAD, well-developed, well-nourished  HEAD:  Atraumatic, Normocephalic  EYES: EOMI, PERRLA, conjunctiva and sclera clear  ENT: slightly dry MM, no pharyngeal erythema or tonsillar enlargement  NECK: Supple, No JVD, no LAD  CHEST/LUNG: Diffuse wheezing; good airflow; no rales or rhonchi; speaking in full sentences; coughing induced by deep breathing   HEART: Tachycardic, regular rhythm; No murmurs, rubs, or gallops, (+)S1, S2  ABDOMEN: Soft, Nontender, Nondistended; Normal Bowel sounds   EXTREMITIES:  2+ Peripheral Pulses, No clubbing, cyanosis, or edema  PSYCH: normal mood and affect; denies SI/HI  NEUROLOGY: AAOx3, non-focal  SKIN: No rashes or lesions; R forearm lipoma

## 2019-02-19 NOTE — DISCHARGE NOTE ADULT - CARE PROVIDER_API CALL
Sy Tang)  Critical Care Medicine; Internal Medicine; Pulmonary Disease  [address], [address]  [place], [place] [address]  Phone: [phone]  Fax: [phone]  Follow Up Time:     Garfield Smalls)  Cardiovascular Disease; Internal Medicine  [address]  [place] [address]  Phone: [phone]  Fax: [phone]  Follow Up Time:

## 2019-02-19 NOTE — CONSULT NOTE ADULT - SUBJECTIVE AND OBJECTIVE BOX
CHIEF COMPLAINT:Patient is a 29y old  Male who presents with a chief complaint of shortness of breath, cough (19 Feb 2019 14:10)      HISTORY OF PRESENT ILLNESS:    this is a pleasant 29 male with history of eosinophilia , asthma with frequent cough and syncopal episodes after cough     PAST MEDICAL & SURGICAL HISTORY:  Moderate persistent asthma with acute exacerbation  No significant past surgical history          MEDICATIONS:      ALBUTerol/ipratropium for Nebulization 3 milliLiter(s) Nebulizer every 4 hours  buDESOnide 160 MICROgram(s)/formoterol 4.5 MICROgram(s) Inhaler 2 Puff(s) Inhalation two times a day        methylPREDNISolone sodium succinate Injectable 40 milliGRAM(s) IV Push two times a day    fluticasone propionate 50 MICROgram(s)/spray Nasal Spray 1 Spray(s) Both Nostrils two times a day  sodium chloride 0.65% Nasal 1 Spray(s) Both Nostrils two times a day PRN      FAMILY HISTORY:  Family history of asthma in grandfather (Grandparent)      Non-contributory    SOCIAL HISTORY:    No tobacco, drugs or etoh    Allergies    No Known Allergies    Intolerances    	    REVIEW OF SYSTEMS:  as above  The rest of the 14 points ROS reviewed and except above they are unremarkable.        PHYSICAL EXAM:  T(C): 36.8 (02-19-19 @ 05:42), Max: 36.9 (02-18-19 @ 19:46)  HR: 109 (02-19-19 @ 13:43) (109 - 138)  BP: 133/67 (02-19-19 @ 05:42) (113/83 - 134/90)  RR: 20 (02-19-19 @ 05:42) (20 - 30)  SpO2: 98% (02-19-19 @ 13:43) (93% - 98%)  Wt(kg): --  I&O's Summary      JVP: Normal  Neck: supple  Lung: rhonchi b/l   CV: S1 S2 , Murmur:  Abd: soft  Ext: No edema  neuro: Awake / alert  Psych: flat affect  Skin: normal      LABS/DATA:    TELEMETRY: 	    ECG:  	   	  CARDIAC MARKERS:                                      15.1   12.52 )-----------( 295      ( 19 Feb 2019 14:26 )             45.7     02-19    140  |  103  |  11  ----------------------------<  122<H>  4.0   |  22  |  1.21    Ca    9.9      19 Feb 2019 14:26    TPro  7.8  /  Alb  4.5  /  TBili  0.7  /  DBili  x   /  AST  25  /  ALT  27  /  AlkPhos  86  02-18    proBNP:   Lipid Profile:   HgA1c:   TSH:

## 2019-02-19 NOTE — DISCHARGE NOTE ADULT - CARE PLAN
Principal Discharge DX:	Severe persistent asthma with acute exacerbation  Assessment and plan of treatment:	Improved with Duoneb, solumedrol.  Out patient follow up with Pulmonary Dr Tang,  Secondary Diagnosis:	Eosinophilia  Assessment and plan of treatment:	Please follow up with Pulmonary Dr Tang.  Secondary Diagnosis:	Syncope  Assessment and plan of treatment:	Most likely few seconds episode of sycope sec to cough syncope- No changes on telemetry- echo -  Please follow up with Cardiology in 2 weeks- Dr Smalls Principal Discharge DX:	Severe persistent asthma with acute exacerbation  Goal:	prevent exacerbation  Assessment and plan of treatment:	Improved with Duoneb, solumedrol.  Out patient follow up with Pulmonary Dr Tang,  Secondary Diagnosis:	Eosinophilia  Assessment and plan of treatment:	Please follow up with Pulmonary Dr Tang.  Secondary Diagnosis:	Syncope  Assessment and plan of treatment:	Most likely few seconds episode of sycope sec to cough syncope- No changes on telemetry- echo -  Please follow up with Cardiology in 2 weeks- Dr Smalls

## 2019-02-19 NOTE — PROGRESS NOTE ADULT - ATTENDING COMMENTS
Eosinophilic asthma versus bronchopulmonary eosinophilic sy.  Above plan d/w fellow. Eosinophilic asthma versus bronchopulmonary eosinophilic sy. Would check for strongyloides and filarial ab.  Above plan d/w fellow.

## 2019-02-19 NOTE — DISCHARGE NOTE ADULT - REASON FOR ADMISSION
shortness of breath, cough shortness of breath, cough- secondary to Asthma exacerbation  Cough syncope

## 2019-02-19 NOTE — CHART NOTE - NSCHARTNOTEFT_GEN_A_CORE
29-year-old male with severe persistent asthma, presented from home with worsening shortness of breath and coughing fits.  Patient was noted to have a syncopal episode after having another coughing fit that was witnessed by his wife at bedside.  Per wife he was sitting in bed when he started coughing and shaking.  His heart rate increased to 150s and patient felt palpitations.  RN called to have respiratory place patient on Duoneb.  Upon evaluation patient is on nebulizer treatment and was noted to have wheezing bilaterally on exam.  Plan to continue IV steroids and nebulizer treatment as ordered.  Await pulmonary labs.

## 2019-02-19 NOTE — DISCHARGE NOTE ADULT - PATIENT PORTAL LINK FT
You can access the [facility] Patient Portal, offered by [facility], by registering with the following website: [email or web address]

## 2019-02-19 NOTE — DISCHARGE NOTE ADULT - HOSPITAL COURSE
29-year-old male with severe persistent asthma, presenting from home with worsening shortness of breath and coughing fits.        Severe persistent asthma with acute exacerbation.   -Pulm consulted  -S/p po prednisone, changed to Solumedrol 40 IV BID given post tussive vomiting and severe symptoms.   -Per pulm Nebs q6, would add extra PRN albuterol q4  -budesonide/formoterol, fluticasone/nasal saline   -RVP negative  -CXR- prelim clear ---  -F/u sputum cx ---      Eosinophilia.    -leukocytosis with marked eosinophilia on diff, unchanged from prior; prior outpatient pulm work-up for autoimmune diseases with negative ANCA, Sjogrens, ELMER; negative QFG; mildly elevated C4 and RF; notably elevated IgE; prior Strongyloides testing negative.  -consider outpatient skin testing for environmental allergies.   -f/u strongyloides and filarial ab----      SIRS (systemic inflammatory response syndrome).    -Meets SIRS criteria secondary to tachycardia/tachypnea and stable leukocytosis  -RVP negative  -CXR: prelim negative  -afebrile, monitor off of abx  -lactate increased in setting of nebs - trend.       Cough syncope  -Cards consulted  -Wants tele monitoring to capture event  -Control cough/asthma exac   -F/u echo ---     2/19/19- transferred to tele     Need for prophylactic measure. 29-year-old male with severe persistent asthma, presenting from home with worsening shortness of breath and coughing fits.  Patient reports worsening shortness of breath and dyspnea on exertion for the past few weeks since his Fasenra has recently changed from Q4 week injections to Q8 week injections - last injection was beginning of January, due for next injection at pulm office on 2/26.  He reports night sweats last night, occasional chills/subjective fevers.   Reports coughing fits, cough occasionally productive of dark brown sputum, with associated dizziness; occasionally streaks of blood noted at end of coughing fit otherwise no hemoptysis.  Reports prior history of syncopal episodes resulting from coughing fits (last time was approximately 2 months ago).  He denies recent antibiotic or steroid use, no recent sick contacts or travel.  Reports that FERNANDO is so severe it has begun to limit his activities.  He states he has to stop to catch his breath while ambulating due to FERNANDO.  Last subjective temp was approximately 2 weeks ago.  He has never required BiPAP/CPAP or intubation.  Reports post-nasal drip and resulting irritated throat.  Occasionally has NBNB vomiting with coughing fits.  Denies GERD, chest pain, palpitations, anxiety, nausea, abdominal pain, diarrhea, constipations, dysuria, hematuria.   He was seen by pulmonary for severe persistent Asthma Exc- treated with solumedrol iv and Duoneb.  He was noted to have elevated eosinophils and antibody testing for strongyloides as well as filarial was sent.  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  He noted few seconds sensation of passing out when he has severe cough with wheezing and was moved to telemetry and cardiology consult called.- Only Sinus tachy seen on tele, Echo showed..  Pulmonary rec Ct of Chest w/o contrast-  he was noted to have improved breathing and resolved Asthma Exc on   and was d/c home to f/u with pulmonary Dr Tang on 2/26 or 2/27/19 as well as Cardiology in 2 weeks      Severe persistent asthma with acute exacerbation.   -Pulm consulted  -S/p po prednisone, changed to Solumedrol 40 IV BID given post tussive vomiting and severe symptoms.   -Per pulm Nebs q6, would add extra PRN albuterol q4  -budesonide/formoterol, fluticasone/nasal saline   -RVP negative  -CXR- prelim clear ---  -F/u sputum cx ---      Eosinophilia.    -leukocytosis with marked eosinophilia on diff, unchanged from prior; prior outpatient pulm work-up for autoimmune diseases with negative ANCA, Sjogrens, ELMER; negative QFG; mildly elevated C4 and RF; notably elevated IgE; prior Strongyloides testing negative.  -consider outpatient skin testing for environmental allergies.   -f/u strongyloides and filarial ab----      SIRS (systemic inflammatory response syndrome).    -Meets SIRS criteria secondary to tachycardia/tachypnea and stable leukocytosis  -RVP negative  -CXR: prelim negative  -afebrile, monitor off of abx  -lactate increased in setting of nebs - trend.       Cough syncope  -Cards consulted  -Wants tele monitoring to capture event  -Control cough/asthma exac   -F/u echo ---     2/19/19- transferred to tele     Need for prophylactic measure. 29-year-old male with severe persistent asthma, presenting from home with worsening shortness of breath and coughing fits.  Patient reports worsening shortness of breath and dyspnea on exertion for the past few weeks since his Fasenra has recently changed from Q4 week injections to Q8 week injections - last injection was beginning of January, due for next injection at pulm office on 2/26.  He reports night sweats last night, occasional chills/subjective fevers.   Reports coughing fits, cough occasionally productive of dark brown sputum, with associated dizziness; occasionally streaks of blood noted at end of coughing fit otherwise no hemoptysis.  Reports prior history of syncopal episodes resulting from coughing fits (last time was approximately 2 months ago).  He denies recent antibiotic or steroid use, no recent sick contacts or travel.  Reports that FERNANDO is so severe it has begun to limit his activities.  He states he has to stop to catch his breath while ambulating due to FERNANDO.  Last subjective temp was approximately 2 weeks ago.  He has never required BiPAP/CPAP or intubation.  Reports post-nasal drip and resulting irritated throat.  Occasionally has NBNB vomiting with coughing fits.  Denies GERD, chest pain, palpitations, anxiety, nausea, abdominal pain, diarrhea, constipations, dysuria, hematuria.   He was seen by pulmonary for severe persistent Asthma Exc- treated with solumedrol iv and Duoneb.  He was noted to have elevated eosinophils and antibody testing for strongyloides as well as filarial was sent.  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  He noted few seconds sensation of passing out when he has severe cough with wheezing and was moved to telemetry and cardiology consult called.- Only Sinus tachy seen on tele, Echo showed..  Pulmonary rec Ct of Chest w/o contrast-  he was noted to have improved breathing and resolved Asthma Exc on   and was d/c home to f/u with pulmonary Dr Tang on 2/26 or 2/27/19 as well as Cardiology in 2 weeks      Severe persistent asthma with acute exacerbation.   -Pulm consulted  -S/p po prednisone, changed to Solumedrol 40 IV BID given post tussive vomiting and severe symptoms.   -Per pulm Nebs q6, would add extra PRN albuterol q4  -budesonide/formoterol, fluticasone/nasal saline   -RVP negative  -CXR- prelim clear ---  -F/u sputum cx ---      Eosinophilia.    -leukocytosis with marked eosinophilia on diff, unchanged from prior; prior outpatient pulm work-up for autoimmune diseases with negative ANCA, Sjogrens, ELMER; negative QFG; mildly elevated C4 and RF; notably elevated IgE; prior Strongyloides testing negative.  -consider outpatient skin testing for environmental allergies.   -f/u strongyloides and filarial ab----      SIRS (systemic inflammatory response syndrome).    -Meets SIRS criteria secondary to tachycardia/tachypnea and stable leukocytosis  -RVP negative  -CXR: prelim negative  -afebrile, monitor off of abx  -lactate increased in setting of nebs - trend.       Cough syncope  -Cards consulted  -Wants tele monitoring to capture event  -Control cough/asthma exac   -F/u echo ---     2/19/19- transferred to tele     Discussed with MD - pt stable for discharge home, pt with no complaints, discharge medications reviewed with MD. 29-year-old male with severe persistent asthma, presenting from home with worsening shortness of breath and coughing fits.  Patient reports worsening shortness of breath and dyspnea on exertion for the past few weeks since his Fasenra has recently changed from Q4 week injections to Q8 week injections - last injection was beginning of January, due for next injection at pulm office on 2/26.  He reports night sweats last night, occasional chills/subjective fevers.   Reports coughing fits, cough occasionally productive of dark brown sputum, with associated dizziness; occasionally streaks of blood noted at end of coughing fit otherwise no hemoptysis.  Reports prior history of syncopal episodes resulting from coughing fits (last time was approximately 2 months ago).  He denies recent antibiotic or steroid use, no recent sick contacts or travel.  Reports that FERNANDO is so severe it has begun to limit his activities.  He states he has to stop to catch his breath while ambulating due to FERNANDO.  Last subjective temp was approximately 2 weeks ago.  He has never required BiPAP/CPAP or intubation.  Reports post-nasal drip and resulting irritated throat.  Occasionally has NBNB vomiting with coughing fits.  Denies GERD, chest pain, palpitations, anxiety, nausea, abdominal pain, diarrhea, constipations, dysuria, hematuria.   He was seen by pulmonary for severe persistent Asthma Exc- treated with solumedrol iv and Duoneb.  He was noted to have elevated eosinophils and antibody testing for strongyloides as well as filarial was sent.  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  He noted few seconds sensation of passing out when he has severe cough with wheezing and was moved to telemetry and cardiology consult called.- Only Sinus tachy seen on tele, Echo 2/21/19- Min MR , otherwise normal.  Pulmonary rec Ct of Chest w/o contrast-2/22/ct of chest-  < from: CT Chest No Cont (02.22.19 @ 09:05) >  Impression: Very mild bronchiectasis along with mucoid impaction of   multiple bronchi within both lungs.  Hewas started on Po Levaquin for 1 week and Pred 40 mg Qd to be tapered by Pulmonary out patient.	  He was noted to have improved breathing and resolved Asthma Exc and was d/c home 2/22/19 to f/u with pulmonary Dr Tang on 2/26 or 2/27/19 as well as Cardiology in 2 weeks.      Severe persistent asthma with acute exacerbation.   -Pulm consulted  -S/p po prednisone, changed to Solumedrol 40 IV BID given post tussive vomiting and severe symptoms.   -Per pulm Nebs q6, would add extra PRN albuterol q4  -budesonide/formoterol, fluticasone/nasal saline   -RVP negative  -CXR- prelim clear ---  -F/u sputum cx ---      Eosinophilia.    -leukocytosis with marked eosinophilia on diff, unchanged from prior; prior outpatient pulm work-up for autoimmune diseases with negative ANCA, Sjogrens, ELMER; negative QFG; mildly elevated C4 and RF; notably elevated IgE; prior Strongyloides testing negative.  -consider outpatient skin testing for environmental allergies.   -f/u strongyloides and filarial ab----      SIRS (systemic inflammatory response syndrome).    -Meets SIRS criteria secondary to tachycardia/tachypnea and stable leukocytosis  -RVP negative  -CXR: prelim negative  -afebrile, monitor off of abx  -lactate increased in setting of nebs - trend.       Cough syncope  -Cards consulted  -Wants tele monitoring to capture event  -Control cough/asthma exac   -F/u echo ---     2/19/19- transferred to tele     Discussed with MD - pt stable for discharge home, pt with no complaints, discharge medications reviewed with MD.

## 2019-02-19 NOTE — PROGRESS NOTE ADULT - ATTENDING COMMENTS
Pulmonary consult- will need out pt F/U Pulmonary consult- will need out pt F/U  Cardiology consult called

## 2019-02-19 NOTE — CONSULT NOTE ADULT - ASSESSMENT
Cough Syncope  due to vagal triggers with persistent cough outbursts  transfer to tele   cough control  obtain echo     Cough / asthma / eosinophilia   on sternoids  fu with pulm

## 2019-02-19 NOTE — H&P ADULT - PROBLEM SELECTOR PLAN 2
leukocytosis with marked eosinophilia on diff, unchanged from prior; prior outpatient pulm work-up for autoimmune diseases with negative ANCA, Sjogrens, ELMER; negative QFG; mildly elevated C4 and RF; notably elevated IgE; prior Strongyloides testing negative.  consider outpatient skin testing for environmental allergies

## 2019-02-19 NOTE — DISCHARGE NOTE ADULT - MEDICATION SUMMARY - MEDICATIONS TO TAKE
I will START or STAY ON the medications listed below when I get home from the hospital:    predniSONE 20 mg oral tablet  -- 2 tab(s) by mouth once a day  -- Indication: For asthma    albuterol 2.5 mg/3 mL (0.083%) inhalation solution  -- 2.5 milligram(s) by nebulizer every 4 hours, As Needed -for bronchospasm   -- Indication: For asthma    budesonide-formoterol 160 mcg-4.5 mcg/inh inhalation aerosol  -- 2 puff(s) inhaled 2 times a day   -- Indication: For asthma    ProAir HFA 90 mcg/inh inhalation aerosol  -- 2 puff(s) inhaled every 6 hours, As Needed  -- Indication: For asthma    guaiFENesin 100 mg/5 mL oral liquid  -- 5 milliliter(s) by mouth every 6 hours, As needed, Cough  -- Indication: For cough    Fasenra 30 mg/mL subcutaneous solution  -- subcutaneous every 8 weeks  -- Indication: For asthma    sodium chloride 0.65% nasal spray  -- 1 spray(s) into nose 3 times a day   -- Indication: For Nasal spray    fluticasone 50 mcg/inh nasal spray  -- 1 spray(s) into nose 2 times a day  -- Indication: For Nasal spray    Omega-3 oral capsule  -- 1 cap(s) by mouth once a day  -- Indication: For Supplement    levoFLOXacin 500 mg oral tablet  -- 1 tab(s) by mouth every 24 hours  -- Indication: For bronchitis

## 2019-02-19 NOTE — H&P ADULT - NSHPLABSRESULTS_GEN_ALL_CORE
02-18    140  |  102  |  13  ----------------------------<  98  4.1   |  24  |  1.12    Ca    9.4      18 Feb 2019 22:00    TPro  7.8  /  Alb  4.5  /  TBili  0.7  /  DBili  x   /  AST  25  /  ALT  27  /  AlkPhos  86  02-18                        16.0   15.29 )-----------( 308      ( 18 Feb 2019 22:00 )             49.2       00:45 - VBG - pH: 7.33  | pCO2: 52    | pO2: 30    | Lactate: 2.9    22:20 - VBG - pH: 7.33  | pCO2: 53    | pO2: 27    | Lactate: 2.0      FLU A B RSV Detection by PCR (02.18.19 @ 22:00)    Flu A Result: Not Detected    Flu B Result: Not Detected    RSV Result: Not Detected     CXR personally reviewed - hyperinflated lungs with prominent vascular markings, no focal consolidations

## 2019-02-19 NOTE — H&P ADULT - HISTORY OF PRESENT ILLNESS
29-year-old male with severe persistent asthma, presenting from home with worsening shortness of breath and coughing fits.  Patient reports worsening shortness of breath and dyspnea on exertion for the past few weeks since his Fasenra has recently changed from Q4 week injections to Q8 week injections - last injection was beginning of January, due for next injection at pul office on 2/26.  He reports night sweats last night, occasional chills/subjective fevers.   Reports coughing fits, cough occasionally productive of dark brown sputum, with associated dizziness; occasionally streaks of blood noted at end of coughing fit otherwise no hemoptysis.  Reports prior history of syncopal episodes resulting from coughing fits (last time was approximately 2 months ago).  He denies recent antibiotic or steroid use, no recent sick contacts or travel.  Reports that FERNANDO is so severe it has begun to limit his activities.  He states he has to stop to catch his breath while ambulating due to FERNANDO.  Last subjective temp was approximately 2 weeks ago.  He has never required BiPAP/CPAP or intubation.  Reports post-nasal drip and resulting irritated throat.  Occasionally has NBNB vomiting with coughing fits.  Denies GERD, chest pain, palpitations, anxiety, nausea, abdominal pain, diarrhea, constipations, dysuria, hematuria.     In the ED VS: 98.4  117  113-125/80-83  22-30  93-96%RA, received albuterol/ipratropium nebs x2, magnesium 2g IV x1, prednisone 60mg PO x1

## 2019-02-19 NOTE — PROGRESS NOTE ADULT - ASSESSMENT
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  Though his symptoms have worsened a few weeks after the flu he does not appear to be infected at this time, with no sign of pna, or new viral illness, like just exacerbation of already poorly controlled asthma.      - Would change prednisone to Solumedrol 40 IV BID given post tussive vomiting and severe symptoms.   - Continue with Nebs q6, would add extra PRN albuterol q4 (can be taken in addition to DUO nebs)  - Sputum culture.  - No need to reorder Eosinophilia work up at this time.  - Case to be discussed with attending   will follow up    Zheng Jones MD PGY6  Pulmonary and Critical Care Fellow  p# [phone]

## 2019-02-19 NOTE — PROGRESS NOTE ADULT - SUBJECTIVE AND OBJECTIVE BOX
Patient is a 29y old  Male who presents with a chief complaint of shortness of breath, cough (19 Feb 2019 10:57)      SUBJECTIVE / OVERNIGHT EVENTS:  Patient seen with wife at bedside  Notes he passes out for few seconds when he coughs    MEDICATIONS  (STANDING):  ALBUTerol/ipratropium for Nebulization 3 milliLiter(s) Nebulizer every 4 hours  buDESOnide 160 MICROgram(s)/formoterol 4.5 MICROgram(s) Inhaler 2 Puff(s) Inhalation two times a day  fluticasone propionate 50 MICROgram(s)/spray Nasal Spray 1 Spray(s) Both Nostrils two times a day  predniSONE   Tablet 40 milliGRAM(s) Oral daily    MEDICATIONS  (PRN):  sodium chloride 0.65% Nasal 1 Spray(s) Both Nostrils two times a day PRN Nasal Congestion        PHYSICAL EXAM:  Vital Signs Last 24 Hrs  T(C): 36.8 (19 Feb 2019 05:42), Max: 36.9 (18 Feb 2019 19:46)  T(F): 98.3 (19 Feb 2019 05:42), Max: 98.4 (18 Feb 2019 19:46)  HR: 112 (19 Feb 2019 11:08) (112 - 138)  BP: 133/67 (19 Feb 2019 05:42) (113/83 - 134/90)  BP(mean): --  RR: 20 (19 Feb 2019 05:42) (20 - 30)  SpO2: 95% (19 Feb 2019 11:08) (93% - 97%)  GENERAL: NAD, well-developed  HEAD:  Atraumatic, Normocephalic  EYES: EOMI, PERRLA, conjunctiva and sclera clear  NECK: Supple, No JVD  CHEST/LUNG: POSITIVE WHEEZING  HEART: Regular rate and rhythm; No murmurs, rubs, or gallops  ABDOMEN: Soft, Nontender, Nondistended; Bowel sounds present  EXTREMITIES:  2+ Peripheral Pulses, No clubbing, cyanosis, or edema  PSYCH: AAOx3  NEUROLOGY: non-focal  SKIN: No rashes or lesions    LABS:                        16.0   15.29 )-----------( 308      ( 18 Feb 2019 22:00 )             49.2     02-18    140  |  102  |  13  ----------------------------<  98  4.1   |  24  |  1.12    Ca    9.4      18 Feb 2019 22:00    TPro  7.8  /  Alb  4.5  /  TBili  0.7  /  DBili  x   /  AST  25  /  ALT  27  /  AlkPhos  86  02-18        RADIOLOGY & ADDITIONAL TESTS:    Imaging Personally Reviewed:    Consultant(s) Notes Reviewed:      Care Discussed with Consultants/Other Providers:

## 2019-02-20 DIAGNOSIS — R55 SYNCOPE AND COLLAPSE: ICD-10-CM

## 2019-02-20 LAB
ANION GAP SERPL CALC-SCNC: 16 MMO/L — HIGH (ref 7–14)
BASOPHILS # BLD AUTO: 0.04 K/UL — SIGNIFICANT CHANGE UP (ref 0–0.2)
BASOPHILS NFR BLD AUTO: 0.3 % — SIGNIFICANT CHANGE UP (ref 0–2)
BUN SERPL-MCNC: 17 MG/DL — SIGNIFICANT CHANGE UP (ref 7–23)
CALCIUM SERPL-MCNC: 9 MG/DL — SIGNIFICANT CHANGE UP (ref 8.4–10.5)
CHLORIDE SERPL-SCNC: 104 MMOL/L — SIGNIFICANT CHANGE UP (ref 98–107)
CO2 SERPL-SCNC: 21 MMOL/L — LOW (ref 22–31)
CREAT SERPL-MCNC: 1.06 MG/DL — SIGNIFICANT CHANGE UP (ref 0.5–1.3)
EOSINOPHIL # BLD AUTO: 0.03 K/UL — SIGNIFICANT CHANGE UP (ref 0–0.5)
EOSINOPHIL NFR BLD AUTO: 0.2 % — SIGNIFICANT CHANGE UP (ref 0–6)
GLUCOSE SERPL-MCNC: 123 MG/DL — HIGH (ref 70–99)
GRAM STN SPT: SIGNIFICANT CHANGE UP
HCT VFR BLD CALC: 44.1 % — SIGNIFICANT CHANGE UP (ref 39–50)
HGB BLD-MCNC: 14.7 G/DL — SIGNIFICANT CHANGE UP (ref 13–17)
IMM GRANULOCYTES NFR BLD AUTO: 0.5 % — SIGNIFICANT CHANGE UP (ref 0–1.5)
LACTATE SERPL-SCNC: 1.3 MMOL/L — SIGNIFICANT CHANGE UP (ref 0.5–2)
LYMPHOCYTES # BLD AUTO: 17.2 % — SIGNIFICANT CHANGE UP (ref 13–44)
LYMPHOCYTES # BLD AUTO: 2.56 K/UL — SIGNIFICANT CHANGE UP (ref 1–3.3)
MCHC RBC-ENTMCNC: 27.9 PG — SIGNIFICANT CHANGE UP (ref 27–34)
MCHC RBC-ENTMCNC: 33.3 % — SIGNIFICANT CHANGE UP (ref 32–36)
MCV RBC AUTO: 83.8 FL — SIGNIFICANT CHANGE UP (ref 80–100)
MONOCYTES # BLD AUTO: 1.13 K/UL — HIGH (ref 0–0.9)
MONOCYTES NFR BLD AUTO: 7.6 % — SIGNIFICANT CHANGE UP (ref 2–14)
NEUTROPHILS # BLD AUTO: 11.01 K/UL — HIGH (ref 1.8–7.4)
NEUTROPHILS NFR BLD AUTO: 74.2 % — SIGNIFICANT CHANGE UP (ref 43–77)
NRBC # FLD: 0 K/UL — LOW (ref 25–125)
PLATELET # BLD AUTO: 293 K/UL — SIGNIFICANT CHANGE UP (ref 150–400)
PMV BLD: 9.9 FL — SIGNIFICANT CHANGE UP (ref 7–13)
POTASSIUM SERPL-MCNC: 3.6 MMOL/L — SIGNIFICANT CHANGE UP (ref 3.5–5.3)
POTASSIUM SERPL-SCNC: 3.6 MMOL/L — SIGNIFICANT CHANGE UP (ref 3.5–5.3)
RBC # BLD: 5.26 M/UL — SIGNIFICANT CHANGE UP (ref 4.2–5.8)
RBC # FLD: 14.4 % — SIGNIFICANT CHANGE UP (ref 10.3–14.5)
SODIUM SERPL-SCNC: 141 MMOL/L — SIGNIFICANT CHANGE UP (ref 135–145)
SPECIMEN SOURCE: SIGNIFICANT CHANGE UP
WBC # BLD: 14.85 K/UL — HIGH (ref 3.8–10.5)
WBC # FLD AUTO: 14.85 K/UL — HIGH (ref 3.8–10.5)

## 2019-02-20 PROCEDURE — 99233 SBSQ HOSP IP/OBS HIGH 50: CPT | Mod: GC

## 2019-02-20 PROCEDURE — 99233 SBSQ HOSP IP/OBS HIGH 50: CPT

## 2019-02-20 RX ADMIN — Medication 40 MILLIGRAM(S): at 05:29

## 2019-02-20 RX ADMIN — Medication 3 MILLILITER(S): at 05:00

## 2019-02-20 RX ADMIN — ALBUTEROL 2.5 MILLIGRAM(S): 90 AEROSOL, METERED ORAL at 03:11

## 2019-02-20 RX ADMIN — Medication 40 MILLIGRAM(S): at 17:05

## 2019-02-20 RX ADMIN — Medication 3 MILLILITER(S): at 15:58

## 2019-02-20 RX ADMIN — Medication 1 SPRAY(S): at 17:05

## 2019-02-20 RX ADMIN — Medication 3 MILLILITER(S): at 21:28

## 2019-02-20 RX ADMIN — BUDESONIDE AND FORMOTEROL FUMARATE DIHYDRATE 2 PUFF(S): 160; 4.5 AEROSOL RESPIRATORY (INHALATION) at 22:08

## 2019-02-20 RX ADMIN — Medication 1 SPRAY(S): at 05:29

## 2019-02-20 RX ADMIN — ALBUTEROL 2.5 MILLIGRAM(S): 90 AEROSOL, METERED ORAL at 19:50

## 2019-02-20 RX ADMIN — BUDESONIDE AND FORMOTEROL FUMARATE DIHYDRATE 2 PUFF(S): 160; 4.5 AEROSOL RESPIRATORY (INHALATION) at 09:21

## 2019-02-20 NOTE — PROGRESS NOTE ADULT - SUBJECTIVE AND OBJECTIVE BOX
Patient is a 29y old  Male who presents with a chief complaint of shortness of breath, cough (20 Feb 2019 10:16)      SUBJECTIVE / OVERNIGHT EVENTS:  Patient seen with wife at bedside.  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  Wife is concerned his " Asthma" started then .  patient and wife noted he had a few second syncope again this AM- only tachycardia on tele    MEDICATIONS  (STANDING):  ALBUTerol/ipratropium for Nebulization 3 milliLiter(s) Nebulizer every 6 hours  buDESOnide 160 MICROgram(s)/formoterol 4.5 MICROgram(s) Inhaler 2 Puff(s) Inhalation two times a day  fluticasone propionate 50 MICROgram(s)/spray Nasal Spray 1 Spray(s) Both Nostrils two times a day  methylPREDNISolone sodium succinate Injectable 40 milliGRAM(s) IV Push two times a day    MEDICATIONS  (PRN):  ALBUTerol    0.083% 2.5 milliGRAM(s) Nebulizer every 4 hours PRN Shortness of Breath and/or Wheezing  sodium chloride 0.65% Nasal 1 Spray(s) Both Nostrils two times a day PRN Nasal Congestion        PHYSICAL EXAM:  Vital Signs Last 24 Hrs  T(C): 36.6 (20 Feb 2019 12:26), Max: 36.7 (19 Feb 2019 19:43)  T(F): 97.8 (20 Feb 2019 12:26), Max: 98.1 (19 Feb 2019 19:43)  HR: 90 (20 Feb 2019 12:26) (90 - 135)  BP: 115/66 (20 Feb 2019 12:26) (110/70 - 134/75)  BP(mean): --  RR: 16 (20 Feb 2019 12:26) (16 - 20)  SpO2: 97% (20 Feb 2019 12:26) (92% - 97%)  GENERAL: NAD, well-developed  HEAD:  Atraumatic, Normocephalic  EYES: EOMI, PERRLA, conjunctiva and sclera clear  NECK: Supple, No JVD  CHEST/LUNG: wheezing b/l  HEART: Regular rate and rhythm; No murmurs, rubs, or gallops  ABDOMEN: Soft, Nontender, Nondistended; Bowel sounds present  EXTREMITIES:  2+ Peripheral Pulses, No clubbing, cyanosis, or edema  PSYCH: AAOx3  NEUROLOGY: non-focal  SKIN: No rashes or lesions    LABS:                        14.7   14.85 )-----------( 293      ( 20 Feb 2019 06:10 )             44.1     02-20    141  |  104  |  17  ----------------------------<  123<H>  3.6   |  21<L>  |  1.06    Ca    9.0      20 Feb 2019 06:10    TPro  7.8  /  Alb  4.5  /  TBili  0.7  /  DBili  x   /  AST  25  /  ALT  27  /  AlkPhos  86  02-18        RADIOLOGY & ADDITIONAL TESTS:    Imaging Personally Reviewed:    Consultant(s) Notes Reviewed:      Care Discussed with Consultants/Other Providers:  Pulm, Card

## 2019-02-20 NOTE — PROGRESS NOTE ADULT - PROBLEM SELECTOR PLAN 2
leukocytosis with marked eosinophilia on diff, unchanged from prior; prior outpatient pulm work-up for autoimmune diseases with negative ANCA, Sjogrens, ELMER; negative QFG; mildly elevated C4 and RF; notably elevated IgE; prior Strongyloides testing negative.  consider outpatient skin testing for environmental allergies  F/u new tests

## 2019-02-20 NOTE — PROGRESS NOTE ADULT - PROBLEM SELECTOR PLAN 1
c/w steroids, nebs ATC (next dose now), budesonide/formoterol, fluticasone/nasal saline   pulm consult appreciated- they will speak with patient and wife  RVP negative  CXR- clear

## 2019-02-20 NOTE — PROGRESS NOTE ADULT - ASSESSMENT
Cough Syncope  due to vagal triggers with persistent cough outbursts  not much event on tele   cough control  obtain echo     Cough / asthma / eosinophilia   on sternoids  fu with pulm

## 2019-02-20 NOTE — PROGRESS NOTE ADULT - ASSESSMENT
29-year-old male with severe persistent asthma, presenting from home with asthma exacerbation  < from: Xray Chest 2 Views PA/Lat (02.18.19 @ 21:40) >    INTERPRETATION:  clear lungs    Acute Asthma exc with eosinophilia- ?? parasytic related- ? Todd's syndrome?   ? cough variant syncope - c/o few sec LOC with cough- now on Tele

## 2019-02-20 NOTE — PROGRESS NOTE ADULT - SUBJECTIVE AND OBJECTIVE BOX
Subjective: Patient seen and examined. No new events except as noted.     SUBJECTIVE/ROS:  had near syncope this am post coughing       MEDICATIONS:  MEDICATIONS  (STANDING):  ALBUTerol/ipratropium for Nebulization 3 milliLiter(s) Nebulizer every 6 hours  buDESOnide 160 MICROgram(s)/formoterol 4.5 MICROgram(s) Inhaler 2 Puff(s) Inhalation two times a day  fluticasone propionate 50 MICROgram(s)/spray Nasal Spray 1 Spray(s) Both Nostrils two times a day  methylPREDNISolone sodium succinate Injectable 40 milliGRAM(s) IV Push two times a day      PHYSICAL EXAM:  T(C): 36.6 (02-20-19 @ 12:26), Max: 36.7 (02-19-19 @ 19:43)  HR: 90 (02-20-19 @ 12:26) (90 - 135)  BP: 115/66 (02-20-19 @ 12:26) (110/70 - 134/75)  RR: 16 (02-20-19 @ 12:26) (16 - 20)  SpO2: 97% (02-20-19 @ 12:26) (92% - 97%)  Wt(kg): --  I&O's Summary    20 Feb 2019 07:01  -  20 Feb 2019 15:42  --------------------------------------------------------  IN: 360 mL / OUT: 0 mL / NET: 360 mL      Height (cm): 182.88 (02-20 @ 05:22)  Weight (kg): 73.3 (02-20 @ 05:22)  BMI (kg/m2): 21.9 (02-20 @ 05:22)  BSA (m2): 1.95 (02-20 @ 05:22)    JVP: Normal  Neck: supple  Lung: clear   CV: S1 S2 , Murmur:  Abd: soft  Ext: No edema  neuro: Awake / alert  Psych: flat affect  Skin: normal      LABS/DATA:    CARDIAC MARKERS:                                14.7   14.85 )-----------( 293      ( 20 Feb 2019 06:10 )             44.1     02-20    141  |  104  |  17  ----------------------------<  123<H>  3.6   |  21<L>  |  1.06    Ca    9.0      20 Feb 2019 06:10    TPro  7.8  /  Alb  4.5  /  TBili  0.7  /  DBili  x   /  AST  25  /  ALT  27  /  AlkPhos  86  02-18    proBNP:   Lipid Profile:   HgA1c:   TSH:     TELE:  EKG:

## 2019-02-20 NOTE — PROGRESS NOTE ADULT - SUBJECTIVE AND OBJECTIVE BOX
Interval Events:  Feeling slightly improved  Any exertion or deep breathing triggers coughing fit  On Solumedrol 40 mg Q12H  Abs Eosinophil count initially 3.64 on admission now 0.03    REVIEW OF SYSTEMS:  [x] All other systems negative  [ ] Unable to assess ROS because ________    OBJECTIVE:  ICU Vital Signs Last 24 Hrs  T(C): 36.6 (20 Feb 2019 05:22), Max: 36.7 (19 Feb 2019 19:43)  T(F): 97.8 (20 Feb 2019 05:22), Max: 98.1 (19 Feb 2019 19:43)  HR: 99 (20 Feb 2019 05:22) (96 - 135)  BP: 121/65 (20 Feb 2019 05:22) (110/70 - 134/75)  BP(mean): --  ABP: --  ABP(mean): --  RR: 16 (20 Feb 2019 05:22) (16 - 20)  SpO2: 96% (20 Feb 2019 05:22) (92% - 98%)        CAPILLARY BLOOD GLUCOSE        PHYSICAL EXAM:  General: Comfortable, non-toxic  Neck: No JVD  Respiratory:  Quiet breathing normal breath sounds. Deeper inspiration triggers coughing fit.  Cardiovascular: RRR, no murmur, no edema  Abdomen: Soft, NT/ND  Extremities: no cyanosis or clubbing  Skin: No rash  Neurological: A&Ox3  Psychiatry: Normal mood and affect.     HOSPITAL MEDICATIONS:        methylPREDNISolone sodium succinate Injectable 40 milliGRAM(s) IV Push two times a day    ALBUTerol    0.083% 2.5 milliGRAM(s) Nebulizer every 4 hours PRN  ALBUTerol/ipratropium for Nebulization 3 milliLiter(s) Nebulizer every 6 hours  buDESOnide 160 MICROgram(s)/formoterol 4.5 MICROgram(s) Inhaler 2 Puff(s) Inhalation two times a day                fluticasone propionate 50 MICROgram(s)/spray Nasal Spray 1 Spray(s) Both Nostrils two times a day  sodium chloride 0.65% Nasal 1 Spray(s) Both Nostrils two times a day PRN        LABS:                        14.7   14.85 )-----------( 293      ( 20 Feb 2019 06:10 )             44.1     Hgb Trend: 14.7<--, 15.1<--, 16.0<--  02-20    141  |  104  |  17  ----------------------------<  123<H>  3.6   |  21<L>  |  1.06    Ca    9.0      20 Feb 2019 06:10    TPro  7.8  /  Alb  4.5  /  TBili  0.7  /  DBili  x   /  AST  25  /  ALT  27  /  AlkPhos  86  02-18    Creatinine Trend: 1.06<--, 1.21<--, 1.12<--        Venous Blood Gas:  02-19 @ 14:26  7.42/37/57/24/89.2  VBG Lactate: 3.4  Venous Blood Gas:  02-19 @ 00:45  7.33/52/30/23/46.9  VBG Lactate: 2.9  Venous Blood Gas:  02-18 @ 22:20  7.33/53/27/23/41.9  VBG Lactate: 2.0      MICROBIOLOGY:     RADIOLOGY:  [x] Reviewed and interpreted by me  CXR 2/18 - clear    ASSESSMENT AND RECOMMENDATION:    29 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 Symptoms particularly got worse when he switched from monthly dosing to every two months. He appears non-toxic and there is no evidence for acute infection.    Continue Solumedrol 40 Q12H  DuoNebs Q6H  Symbicort  Follow up sputum culture, strongyloides, and flarial abs    Marky Benavides MD  Pulmonary and Critical Care Fellow  [phone]

## 2019-02-20 NOTE — PROGRESS NOTE ADULT - ATTENDING COMMENTS
29 year old man with eosinophilic asthma on anti IL5 therapy (benralizumab) admitted with exacerbation of asthma the patient and his wife report that he was doing well with the monthly injections of the benralizumab but when the injections were taken to every other month his symptoms worsened    - review of the outpatient records show that the patients last injection was 1/2/19 and the flu swab from that day was + for influenza  - most likely that his current exacerbation is partly due to post viral cough  - continue steroids and bronchodilators  - the patient reports some decrease in his symptoms  - will need slow taper of his steroids  - will need outpatient follow up to determine whether or not to continue with anit IL5 therapy or needs other treatment    case discussed in detail with Patient and his wife

## 2019-02-20 NOTE — PROGRESS NOTE ADULT - PROBLEM SELECTOR PLAN 5
few sec at a time with coughing as per patient and wife.  c/w tele.  echo ord.  EKG ord  Cardiology consult appreciated.

## 2019-02-21 LAB
ANION GAP SERPL CALC-SCNC: 15 MMO/L — HIGH (ref 7–14)
BASOPHILS # BLD AUTO: 0.03 K/UL — SIGNIFICANT CHANGE UP (ref 0–0.2)
BASOPHILS NFR BLD AUTO: 0.2 % — SIGNIFICANT CHANGE UP (ref 0–2)
BUN SERPL-MCNC: 19 MG/DL — SIGNIFICANT CHANGE UP (ref 7–23)
CALCIUM SERPL-MCNC: 8.7 MG/DL — SIGNIFICANT CHANGE UP (ref 8.4–10.5)
CHLORIDE SERPL-SCNC: 103 MMOL/L — SIGNIFICANT CHANGE UP (ref 98–107)
CO2 SERPL-SCNC: 20 MMOL/L — LOW (ref 22–31)
CREAT SERPL-MCNC: 1.05 MG/DL — SIGNIFICANT CHANGE UP (ref 0.5–1.3)
EOSINOPHIL # BLD AUTO: 0.01 K/UL — SIGNIFICANT CHANGE UP (ref 0–0.5)
EOSINOPHIL NFR BLD AUTO: 0.1 % — SIGNIFICANT CHANGE UP (ref 0–6)
GLUCOSE SERPL-MCNC: 146 MG/DL — HIGH (ref 70–99)
HCT VFR BLD CALC: 44.2 % — SIGNIFICANT CHANGE UP (ref 39–50)
HCT VFR BLD CALC: 44.2 % — SIGNIFICANT CHANGE UP (ref 39–50)
HGB BLD-MCNC: 14 G/DL — SIGNIFICANT CHANGE UP (ref 13–17)
HGB BLD-MCNC: 14 G/DL — SIGNIFICANT CHANGE UP (ref 13–17)
IMM GRANULOCYTES NFR BLD AUTO: 0.7 % — SIGNIFICANT CHANGE UP (ref 0–1.5)
LYMPHOCYTES # BLD AUTO: 18.9 % — SIGNIFICANT CHANGE UP (ref 13–44)
LYMPHOCYTES # BLD AUTO: 2.85 K/UL — SIGNIFICANT CHANGE UP (ref 1–3.3)
MCHC RBC-ENTMCNC: 27.1 PG — SIGNIFICANT CHANGE UP (ref 27–34)
MCHC RBC-ENTMCNC: 27.1 PG — SIGNIFICANT CHANGE UP (ref 27–34)
MCHC RBC-ENTMCNC: 31.7 % — LOW (ref 32–36)
MCHC RBC-ENTMCNC: 31.7 % — LOW (ref 32–36)
MCV RBC AUTO: 85.7 FL — SIGNIFICANT CHANGE UP (ref 80–100)
MCV RBC AUTO: 85.7 FL — SIGNIFICANT CHANGE UP (ref 80–100)
MONOCYTES # BLD AUTO: 1.31 K/UL — HIGH (ref 0–0.9)
MONOCYTES NFR BLD AUTO: 8.7 % — SIGNIFICANT CHANGE UP (ref 2–14)
NEUTROPHILS # BLD AUTO: 10.75 K/UL — HIGH (ref 1.8–7.4)
NEUTROPHILS NFR BLD AUTO: 71.4 % — SIGNIFICANT CHANGE UP (ref 43–77)
NRBC # FLD: 0 K/UL — LOW (ref 25–125)
NRBC # FLD: 0 K/UL — LOW (ref 25–125)
PLATELET # BLD AUTO: 290 K/UL — SIGNIFICANT CHANGE UP (ref 150–400)
PLATELET # BLD AUTO: 290 K/UL — SIGNIFICANT CHANGE UP (ref 150–400)
PMV BLD: 10 FL — SIGNIFICANT CHANGE UP (ref 7–13)
PMV BLD: 10 FL — SIGNIFICANT CHANGE UP (ref 7–13)
POTASSIUM SERPL-MCNC: 3.5 MMOL/L — SIGNIFICANT CHANGE UP (ref 3.5–5.3)
POTASSIUM SERPL-SCNC: 3.5 MMOL/L — SIGNIFICANT CHANGE UP (ref 3.5–5.3)
RBC # BLD: 5.16 M/UL — SIGNIFICANT CHANGE UP (ref 4.2–5.8)
RBC # BLD: 5.16 M/UL — SIGNIFICANT CHANGE UP (ref 4.2–5.8)
RBC # FLD: 14.4 % — SIGNIFICANT CHANGE UP (ref 10.3–14.5)
RBC # FLD: 14.4 % — SIGNIFICANT CHANGE UP (ref 10.3–14.5)
SODIUM SERPL-SCNC: 138 MMOL/L — SIGNIFICANT CHANGE UP (ref 135–145)
WBC # BLD: 15.05 K/UL — HIGH (ref 3.8–10.5)
WBC # BLD: 15.05 K/UL — HIGH (ref 3.8–10.5)
WBC # FLD AUTO: 15.05 K/UL — HIGH (ref 3.8–10.5)
WBC # FLD AUTO: 15.05 K/UL — HIGH (ref 3.8–10.5)

## 2019-02-21 PROCEDURE — 93306 TTE W/DOPPLER COMPLETE: CPT | Mod: 26

## 2019-02-21 PROCEDURE — 99232 SBSQ HOSP IP/OBS MODERATE 35: CPT

## 2019-02-21 PROCEDURE — 99232 SBSQ HOSP IP/OBS MODERATE 35: CPT | Mod: GC

## 2019-02-21 RX ORDER — POTASSIUM CHLORIDE 20 MEQ
20 PACKET (EA) ORAL ONCE
Qty: 0 | Refills: 0 | Status: COMPLETED | OUTPATIENT
Start: 2019-02-21 | End: 2019-02-21

## 2019-02-21 RX ORDER — ACETAMINOPHEN 500 MG
650 TABLET ORAL ONCE
Qty: 0 | Refills: 0 | Status: COMPLETED | OUTPATIENT
Start: 2019-02-21 | End: 2019-02-21

## 2019-02-21 RX ADMIN — Medication 40 MILLIGRAM(S): at 17:24

## 2019-02-21 RX ADMIN — Medication 3 MILLILITER(S): at 15:43

## 2019-02-21 RX ADMIN — Medication 40 MILLIGRAM(S): at 05:41

## 2019-02-21 RX ADMIN — Medication 1 SPRAY(S): at 17:24

## 2019-02-21 RX ADMIN — Medication 20 MILLIEQUIVALENT(S): at 09:54

## 2019-02-21 RX ADMIN — Medication 3 MILLILITER(S): at 09:29

## 2019-02-21 RX ADMIN — Medication 650 MILLIGRAM(S): at 23:05

## 2019-02-21 RX ADMIN — Medication 3 MILLILITER(S): at 03:10

## 2019-02-21 RX ADMIN — BUDESONIDE AND FORMOTEROL FUMARATE DIHYDRATE 2 PUFF(S): 160; 4.5 AEROSOL RESPIRATORY (INHALATION) at 09:54

## 2019-02-21 RX ADMIN — BUDESONIDE AND FORMOTEROL FUMARATE DIHYDRATE 2 PUFF(S): 160; 4.5 AEROSOL RESPIRATORY (INHALATION) at 22:04

## 2019-02-21 RX ADMIN — Medication 1 SPRAY(S): at 22:04

## 2019-02-21 RX ADMIN — Medication 3 MILLILITER(S): at 22:19

## 2019-02-21 RX ADMIN — Medication 1 SPRAY(S): at 05:41

## 2019-02-21 NOTE — CHART NOTE - NSCHARTNOTEFT_GEN_A_CORE
Patient with complaints of headache without lightheadedness or dizziness. VSS will give a dose of Tylenol.  Continue to monitor.

## 2019-02-21 NOTE — PROGRESS NOTE ADULT - PROBLEM SELECTOR PLAN 5
few sec at a time with coughing as per patient and wife.  c/w tele.  echo ord.  EKG ord  Cardiology consult appreciated. few sec at a time with coughing as per patient and wife.  c/w tele.  echo ord.  patient is on tele  Cardiology consult appreciated.

## 2019-02-21 NOTE — PROGRESS NOTE ADULT - ATTENDING COMMENTS
Strongyloides antibodies and filarial antibodies- testing in lab- results may be back 2/22- spoke with [facility]- this gets sent to [facility] lab.

## 2019-02-21 NOTE — PROGRESS NOTE ADULT - SUBJECTIVE AND OBJECTIVE BOX
Patient is a 29y old  Male who presents with a chief complaint of shortness of breath, cough (21 Feb 2019 09:57)      SUBJECTIVE / OVERNIGHT EVENTS:    MEDICATIONS  (STANDING):  ALBUTerol/ipratropium for Nebulization 3 milliLiter(s) Nebulizer every 6 hours  buDESOnide 160 MICROgram(s)/formoterol 4.5 MICROgram(s) Inhaler 2 Puff(s) Inhalation two times a day  fluticasone propionate 50 MICROgram(s)/spray Nasal Spray 1 Spray(s) Both Nostrils two times a day  methylPREDNISolone sodium succinate Injectable 40 milliGRAM(s) IV Push two times a day    MEDICATIONS  (PRN):  ALBUTerol    0.083% 2.5 milliGRAM(s) Nebulizer every 4 hours PRN Shortness of Breath and/or Wheezing  sodium chloride 0.65% Nasal 1 Spray(s) Both Nostrils two times a day PRN Nasal Congestion        CAPILLARY BLOOD GLUCOSE        I&O's Summary    20 Feb 2019 07:01  -  21 Feb 2019 07:00  --------------------------------------------------------  IN: 360 mL / OUT: 0 mL / NET: 360 mL        PHYSICAL EXAM:  GENERAL: NAD, well-developed  HEAD:  Atraumatic, Normocephalic  EYES: EOMI, PERRLA, conjunctiva and sclera clear  NECK: Supple, No JVD  CHEST/LUNG: Clear to auscultation bilaterally; No wheeze  HEART: Regular rate and rhythm; No murmurs, rubs, or gallops  ABDOMEN: Soft, Nontender, Nondistended; Bowel sounds present  EXTREMITIES:  2+ Peripheral Pulses, No clubbing, cyanosis, or edema  PSYCH: AAOx3  NEUROLOGY: non-focal  SKIN: No rashes or lesions    LABS:                        14.0   15.05 )-----------( 290      ( 21 Feb 2019 04:00 )             44.2     02-21    138  |  103  |  19  ----------------------------<  146<H>  3.5   |  20<L>  |  1.05    Ca    8.7      21 Feb 2019 04:00                RADIOLOGY & ADDITIONAL TESTS:    Imaging Personally Reviewed:    Consultant(s) Notes Reviewed:      Care Discussed with Consultants/Other Providers: Patient is a 29y old  Male who presents with a chief complaint of shortness of breath, cough (21 Feb 2019 09:57)      SUBJECTIVE / OVERNIGHT EVENTS:    MEDICATIONS  (STANDING):  ALBUTerol/ipratropium for Nebulization 3 milliLiter(s) Nebulizer every 6 hours  buDESOnide 160 MICROgram(s)/formoterol 4.5 MICROgram(s) Inhaler 2 Puff(s) Inhalation two times a day  fluticasone propionate 50 MICROgram(s)/spray Nasal Spray 1 Spray(s) Both Nostrils two times a day  methylPREDNISolone sodium succinate Injectable 40 milliGRAM(s) IV Push two times a day    MEDICATIONS  (PRN):  ALBUTerol    0.083% 2.5 milliGRAM(s) Nebulizer every 4 hours PRN Shortness of Breath and/or Wheezing  sodium chloride 0.65% Nasal 1 Spray(s) Both Nostrils two times a day PRN Nasal Congestion        I&O's Summary    20 Feb 2019 07:01  -  21 Feb 2019 07:00  --------------------------------------------------------  IN: 360 mL / OUT: 0 mL / NET: 360 mL        PHYSICAL EXAM:  Vital Signs Last 24 Hrs  T(C): 36.4 (21 Feb 2019 05:22), Max: 36.6 (20 Feb 2019 22:08)  T(F): 97.5 (21 Feb 2019 05:22), Max: 97.9 (20 Feb 2019 22:08)  HR: 94 (21 Feb 2019 09:38) (74 - 111)  BP: 114/73 (21 Feb 2019 05:22) (114/73 - 126/71)  BP(mean): --  RR: 16 (21 Feb 2019 05:22) (16 - 16)  SpO2: 94% (21 Feb 2019 09:38) (94% - 97%)  GENERAL: NAD, well-developed  HEAD:  Atraumatic, Normocephalic  EYES: EOMI, PERRLA, conjunctiva and sclera clear  NECK: Supple, No JVD  CHEST/LUNG: Clear to auscultation bilaterally; No wheeze  HEART: Regular rate and rhythm; No murmurs, rubs, or gallops  ABDOMEN: Soft, Nontender, Nondistended; Bowel sounds present  EXTREMITIES:  2+ Peripheral Pulses, No clubbing, cyanosis, or edema  PSYCH: AAOx3  NEUROLOGY: non-focal  SKIN: No rashes or lesions    LABS:                        14.0   15.05 )-----------( 290      ( 21 Feb 2019 04:00 )             44.2     02-21    138  |  103  |  19  ----------------------------<  146<H>  3.5   |  20<L>  |  1.05    Ca    8.7      21 Feb 2019 04:00                RADIOLOGY & ADDITIONAL TESTS:      Care Discussed with Consultants/Other Providers:  Pulmonary- request ct of chest w/o cont

## 2019-02-21 NOTE — PROGRESS NOTE ADULT - PROBLEM SELECTOR PLAN 2
leukocytosis with marked eosinophilia on diff, unchanged from prior; prior outpatient pulm work-up for autoimmune diseases with negative ANCA, Sjogrens, ELMER; negative QFG; mildly elevated C4 and RF; notably elevated IgE; prior Strongyloides testing negative.  consider outpatient skin testing for environmental allergies  F/u new tests leukocytosis with marked eosinophilia on diff, unchanged from prior; prior outpatient pulm work-up for autoimmune diseases with negative ANCA, Sjogrens, ELMER; negative QFG; mildly elevated C4 and RF; notably elevated IgE; prior Strongyloides testing again ordered- 2/20- results testing  consider outpatient skin testing for environmental allergies

## 2019-02-21 NOTE — PROGRESS NOTE ADULT - ASSESSMENT
29-year-old male with severe persistent asthma, presenting from home with asthma exacerbation  < from: Xray Chest 2 Views PA/Lat (02.18.19 @ 21:40) >    INTERPRETATION:  clear lungs    Acute Asthma exc with eosinophilia- ?? parasytic related- ? Todd's syndrome?   ? cough variant syncope - c/o few sec LOC with cough- now on Tele 29-year-old male with severe persistent asthma, presenting from home with asthma exacerbation  < from: Xray Chest 2 Views PA/Lat (02.18.19 @ 21:40) >    INTERPRETATION:  clear lungs    Acute Asthma exc with eosinophilia- ?? parasytic related- ? Todd's syndrome? - slowly improving Asthma exc  ? cough variant syncope - c/o few sec LOC with cough- now on Tele

## 2019-02-21 NOTE — PROGRESS NOTE ADULT - ASSESSMENT
Cough Syncope  due to vagal triggers with persistent cough outbursts  not much event on tele   cough control  fu result of echo     Cough / asthma / eosinophilia   on sternoids  fu with pulm

## 2019-02-21 NOTE — PROGRESS NOTE ADULT - SUBJECTIVE AND OBJECTIVE BOX
Subjective: Patient seen and examined. No new events except as noted.     SUBJECTIVE/ROS:  pos cough  no syncope       MEDICATIONS:  MEDICATIONS  (STANDING):  ALBUTerol/ipratropium for Nebulization 3 milliLiter(s) Nebulizer every 6 hours  buDESOnide 160 MICROgram(s)/formoterol 4.5 MICROgram(s) Inhaler 2 Puff(s) Inhalation two times a day  fluticasone propionate 50 MICROgram(s)/spray Nasal Spray 1 Spray(s) Both Nostrils two times a day  methylPREDNISolone sodium succinate Injectable 40 milliGRAM(s) IV Push two times a day      PHYSICAL EXAM:  T(C): 36.6 (02-21-19 @ 13:20), Max: 36.6 (02-20-19 @ 22:08)  HR: 102 (02-21-19 @ 13:20) (74 - 111)  BP: 106/52 (02-21-19 @ 13:20) (106/52 - 126/71)  RR: 16 (02-21-19 @ 13:20) (16 - 16)  SpO2: 95% (02-21-19 @ 13:20) (94% - 97%)  Wt(kg): --  I&O's Summary    20 Feb 2019 07:01  -  21 Feb 2019 07:00  --------------------------------------------------------  IN: 360 mL / OUT: 0 mL / NET: 360 mL          Appearance: Normal	  HEENT:   no gross abnormality   Cardiovascular: Normal S1 S2,    Murmur:   Neck: JVP normal  Respiratory: Lungs clear   Gastrointestinal:  Soft, Non-tender  Skin: normal   Neuro: No gross deficits.   Psychiatry:  Mood & affect flat  Ext: No edema      LABS/DATA:    CARDIAC MARKERS:                                14.0   15.05 )-----------( 290      ( 21 Feb 2019 04:00 )             44.2     02-21    138  |  103  |  19  ----------------------------<  146<H>  3.5   |  20<L>  |  1.05    Ca    8.7      21 Feb 2019 04:00      proBNP:   Lipid Profile:   HgA1c:   TSH:     TELE:  EKG:

## 2019-02-21 NOTE — PROGRESS NOTE ADULT - PROBLEM SELECTOR PLAN 1
c/w steroids, nebs ATC (next dose now), budesonide/formoterol, fluticasone/nasal saline   pulm consult appreciated- they will speak with patient and wife  RVP negative  CXR- clear c/w steroids, nebs ATC (next dose now), budesonide/formoterol, fluticasone/nasal saline .  Solumedrol 40 mg q12  pulm consult appreciated- req ct of chest w/o cont  RVP negative  CXR- clear

## 2019-02-21 NOTE — PROGRESS NOTE ADULT - SUBJECTIVE AND OBJECTIVE BOX
Interval Events:  Slightly improved  Slept through the night  Still wheezing    REVIEW OF SYSTEMS:  [x] All other systems negative  [ ] Unable to assess ROS because ________    OBJECTIVE:  ICU Vital Signs Last 24 Hrs  T(C): 36.4 (21 Feb 2019 05:22), Max: 36.6 (20 Feb 2019 12:26)  T(F): 97.5 (21 Feb 2019 05:22), Max: 97.9 (20 Feb 2019 22:08)  HR: 88 (21 Feb 2019 05:22) (74 - 111)  BP: 114/73 (21 Feb 2019 05:22) (114/73 - 126/71)  BP(mean): --  ABP: --  ABP(mean): --  RR: 16 (21 Feb 2019 05:22) (16 - 16)  SpO2: 96% (21 Feb 2019 05:22) (94% - 97%)        02-20 @ 07:01  -  02-21 @ 07:00  --------------------------------------------------------  IN: 360 mL / OUT: 0 mL / NET: 360 mL      CAPILLARY BLOOD GLUCOSE          PHYSICAL EXAM:  General: Comfortable, non-toxic  Neck: No JVD  Respiratory:  Bilateral expiratory wheezing throughout  Cardiovascular: RRR, no murmur, no edema  Abdomen: Soft, NT/ND  Extremities: no cyanosis or clubbing  Skin: No rash  Neurological: A&Ox3  Psychiatry: Normal mood and affect.     HOSPITAL MEDICATIONS:        methylPREDNISolone sodium succinate Injectable 40 milliGRAM(s) IV Push two times a day    ALBUTerol    0.083% 2.5 milliGRAM(s) Nebulizer every 4 hours PRN  ALBUTerol/ipratropium for Nebulization 3 milliLiter(s) Nebulizer every 6 hours  buDESOnide 160 MICROgram(s)/formoterol 4.5 MICROgram(s) Inhaler 2 Puff(s) Inhalation two times a day                fluticasone propionate 50 MICROgram(s)/spray Nasal Spray 1 Spray(s) Both Nostrils two times a day  sodium chloride 0.65% Nasal 1 Spray(s) Both Nostrils two times a day PRN        LABS:                        14.0   15.05 )-----------( 290      ( 21 Feb 2019 04:00 )             44.2     Hgb Trend: 14.0<--, 14.7<--, 15.1<--, 16.0<--  02-21    138  |  103  |  19  ----------------------------<  146<H>  3.5   |  20<L>  |  1.05    Ca    8.7      21 Feb 2019 04:00      Creatinine Trend: 1.05<--, 1.06<--, 1.21<--, 1.12<--        Venous Blood Gas:  02-19 @ 14:26  7.42/37/57/24/89.2  VBG Lactate: 3.4      MICROBIOLOGY:     RADIOLOGY:  [ ] Reviewed and interpreted by me    ASSESSMENT AND RECOMMENDATION:      29 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 Symptoms particularly got worse when he switched from monthly dosing to every two months. He appears non-toxic. Slowly improving.    Abs Eosinophil count 0.01 today  Continue Solumedrol 40 Q12H  DuoNebs Q6H  Symbicort  Follow up sputum culture, strongyloides, and flarial abs    Marky Benavides MD  Pulmonary and Critical Care Fellow  [phone] Interval Events:  Slightly improved  Slept through the night  Still wheezing    REVIEW OF SYSTEMS:  [x] All other systems negative  [ ] Unable to assess ROS because ________    OBJECTIVE:  ICU Vital Signs Last 24 Hrs  T(C): 36.4 (21 Feb 2019 05:22), Max: 36.6 (20 Feb 2019 12:26)  T(F): 97.5 (21 Feb 2019 05:22), Max: 97.9 (20 Feb 2019 22:08)  HR: 88 (21 Feb 2019 05:22) (74 - 111)  BP: 114/73 (21 Feb 2019 05:22) (114/73 - 126/71)  BP(mean): --  ABP: --  ABP(mean): --  RR: 16 (21 Feb 2019 05:22) (16 - 16)  SpO2: 96% (21 Feb 2019 05:22) (94% - 97%)        02-20 @ 07:01  -  02-21 @ 07:00  --------------------------------------------------------  IN: 360 mL / OUT: 0 mL / NET: 360 mL      CAPILLARY BLOOD GLUCOSE          PHYSICAL EXAM:  General: Comfortable, non-toxic  Neck: No JVD  Respiratory:  Bilateral expiratory wheezing throughout  Cardiovascular: RRR, no murmur, no edema  Abdomen: Soft, NT/ND  Extremities: no cyanosis or clubbing  Skin: No rash  Neurological: A&Ox3  Psychiatry: Normal mood and affect.     HOSPITAL MEDICATIONS:        methylPREDNISolone sodium succinate Injectable 40 milliGRAM(s) IV Push two times a day    ALBUTerol    0.083% 2.5 milliGRAM(s) Nebulizer every 4 hours PRN  ALBUTerol/ipratropium for Nebulization 3 milliLiter(s) Nebulizer every 6 hours  buDESOnide 160 MICROgram(s)/formoterol 4.5 MICROgram(s) Inhaler 2 Puff(s) Inhalation two times a day                fluticasone propionate 50 MICROgram(s)/spray Nasal Spray 1 Spray(s) Both Nostrils two times a day  sodium chloride 0.65% Nasal 1 Spray(s) Both Nostrils two times a day PRN        LABS:                        14.0   15.05 )-----------( 290      ( 21 Feb 2019 04:00 )             44.2     Hgb Trend: 14.0<--, 14.7<--, 15.1<--, 16.0<--  02-21    138  |  103  |  19  ----------------------------<  146<H>  3.5   |  20<L>  |  1.05    Ca    8.7      21 Feb 2019 04:00      Creatinine Trend: 1.05<--, 1.06<--, 1.21<--, 1.12<--        Venous Blood Gas:  02-19 @ 14:26  7.42/37/57/24/89.2  VBG Lactate: 3.4      MICROBIOLOGY:     RADIOLOGY:  [ ] Reviewed and interpreted by me    ASSESSMENT AND RECOMMENDATION:      29 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 Symptoms particularly got worse when he switched from monthly dosing to every two months. He appears non-toxic. Slowly improving.    Abs Eosinophil count 0.01 today  Continue Solumedrol 40 Q12H  DuoNebs Q6H  Symbicort  Follow up sputum culture, strongyloides, and flarial abs  Chest non-contrast CT chest   Needs airway clearance - will order Aerobika device with nebulizer    Marky Benavides MD  Pulmonary and Critical Care Fellow  [phone]

## 2019-02-22 VITALS
TEMPERATURE: 98 F | OXYGEN SATURATION: 97 % | RESPIRATION RATE: 16 BRPM | HEART RATE: 96 BPM | SYSTOLIC BLOOD PRESSURE: 124 MMHG | DIASTOLIC BLOOD PRESSURE: 73 MMHG

## 2019-02-22 PROBLEM — J45.41 MODERATE PERSISTENT ASTHMA WITH (ACUTE) EXACERBATION: Chronic | Status: ACTIVE | Noted: 2019-02-18

## 2019-02-22 LAB
ANION GAP SERPL CALC-SCNC: 15 MMO/L — HIGH (ref 7–14)
BACTERIA SPT RESP CULT: SIGNIFICANT CHANGE UP
BASOPHILS # BLD AUTO: 0.02 K/UL — SIGNIFICANT CHANGE UP (ref 0–0.2)
BASOPHILS NFR BLD AUTO: 0.1 % — SIGNIFICANT CHANGE UP (ref 0–2)
BUN SERPL-MCNC: 18 MG/DL — SIGNIFICANT CHANGE UP (ref 7–23)
CALCIUM SERPL-MCNC: 9.3 MG/DL — SIGNIFICANT CHANGE UP (ref 8.4–10.5)
CHLORIDE SERPL-SCNC: 100 MMOL/L — SIGNIFICANT CHANGE UP (ref 98–107)
CO2 SERPL-SCNC: 23 MMOL/L — SIGNIFICANT CHANGE UP (ref 22–31)
CREAT SERPL-MCNC: 1.08 MG/DL — SIGNIFICANT CHANGE UP (ref 0.5–1.3)
EOSINOPHIL # BLD AUTO: 0 K/UL — SIGNIFICANT CHANGE UP (ref 0–0.5)
EOSINOPHIL NFR BLD AUTO: 0 % — SIGNIFICANT CHANGE UP (ref 0–6)
GLUCOSE SERPL-MCNC: 119 MG/DL — HIGH (ref 70–99)
HCT VFR BLD CALC: 46.9 % — SIGNIFICANT CHANGE UP (ref 39–50)
HGB BLD-MCNC: 14.9 G/DL — SIGNIFICANT CHANGE UP (ref 13–17)
IMM GRANULOCYTES NFR BLD AUTO: 0.8 % — SIGNIFICANT CHANGE UP (ref 0–1.5)
LYMPHOCYTES # BLD AUTO: 19.4 % — SIGNIFICANT CHANGE UP (ref 13–44)
LYMPHOCYTES # BLD AUTO: 3 K/UL — SIGNIFICANT CHANGE UP (ref 1–3.3)
MAGNESIUM SERPL-MCNC: 2.2 MG/DL — SIGNIFICANT CHANGE UP (ref 1.6–2.6)
MCHC RBC-ENTMCNC: 27.1 PG — SIGNIFICANT CHANGE UP (ref 27–34)
MCHC RBC-ENTMCNC: 31.8 % — LOW (ref 32–36)
MCV RBC AUTO: 85.4 FL — SIGNIFICANT CHANGE UP (ref 80–100)
MONOCYTES # BLD AUTO: 1.5 K/UL — HIGH (ref 0–0.9)
MONOCYTES NFR BLD AUTO: 9.7 % — SIGNIFICANT CHANGE UP (ref 2–14)
NEUTROPHILS # BLD AUTO: 10.8 K/UL — HIGH (ref 1.8–7.4)
NEUTROPHILS NFR BLD AUTO: 70 % — SIGNIFICANT CHANGE UP (ref 43–77)
NRBC # FLD: 0 K/UL — LOW (ref 25–125)
PLATELET # BLD AUTO: 317 K/UL — SIGNIFICANT CHANGE UP (ref 150–400)
PMV BLD: 9.9 FL — SIGNIFICANT CHANGE UP (ref 7–13)
POTASSIUM SERPL-MCNC: 4 MMOL/L — SIGNIFICANT CHANGE UP (ref 3.5–5.3)
POTASSIUM SERPL-SCNC: 4 MMOL/L — SIGNIFICANT CHANGE UP (ref 3.5–5.3)
RBC # BLD: 5.49 M/UL — SIGNIFICANT CHANGE UP (ref 4.2–5.8)
RBC # FLD: 14.2 % — SIGNIFICANT CHANGE UP (ref 10.3–14.5)
SODIUM SERPL-SCNC: 138 MMOL/L — SIGNIFICANT CHANGE UP (ref 135–145)
STRONGYLOIDES AB SER-ACNC: NEGATIVE — SIGNIFICANT CHANGE UP
WBC # BLD: 15.44 K/UL — HIGH (ref 3.8–10.5)
WBC # FLD AUTO: 15.44 K/UL — HIGH (ref 3.8–10.5)

## 2019-02-22 PROCEDURE — 71250 CT THORAX DX C-: CPT | Mod: 26

## 2019-02-22 PROCEDURE — 99239 HOSP IP/OBS DSCHRG MGMT >30: CPT

## 2019-02-22 PROCEDURE — 99232 SBSQ HOSP IP/OBS MODERATE 35: CPT

## 2019-02-22 RX ORDER — INFLUENZA VIRUS VACCINE 15; 15; 15; 15 UG/.5ML; UG/.5ML; UG/.5ML; UG/.5ML
0.5 SUSPENSION INTRAMUSCULAR ONCE
Qty: 0 | Refills: 0 | Status: COMPLETED | OUTPATIENT
Start: 2019-02-22 | End: 2019-02-22

## 2019-02-22 RX ORDER — OMEGA-3 ACID ETHYL ESTERS 1 G
1 CAPSULE ORAL
Qty: 0 | Refills: 0 | COMMUNITY

## 2019-02-22 RX ORDER — OMEGA-3 ACID ETHYL ESTERS 1 G
0 CAPSULE ORAL
Qty: 0 | Refills: 0 | COMMUNITY

## 2019-02-22 RX ORDER — BENZOYL PEROXIDE MICRONIZED 5.8 %
0 TOWELETTE (EA) TOPICAL
Qty: 0 | Refills: 0 | COMMUNITY

## 2019-02-22 RX ORDER — ALBUTEROL 90 UG/1
2 AEROSOL, METERED ORAL EVERY 6 HOURS
Qty: 0 | Refills: 0 | Status: DISCONTINUED | OUTPATIENT
Start: 2019-02-22 | End: 2019-02-22

## 2019-02-22 RX ORDER — ALBUTEROL 90 UG/1
2.5 AEROSOL, METERED ORAL
Qty: 140 | Refills: 0 | OUTPATIENT
Start: 2019-02-22 | End: 2019-03-07

## 2019-02-22 RX ADMIN — Medication 1 SPRAY(S): at 06:00

## 2019-02-22 RX ADMIN — BUDESONIDE AND FORMOTEROL FUMARATE DIHYDRATE 2 PUFF(S): 160; 4.5 AEROSOL RESPIRATORY (INHALATION) at 09:51

## 2019-02-22 RX ADMIN — Medication 40 MILLIGRAM(S): at 13:27

## 2019-02-22 RX ADMIN — Medication 650 MILLIGRAM(S): at 00:00

## 2019-02-22 RX ADMIN — Medication 3 MILLILITER(S): at 10:49

## 2019-02-22 RX ADMIN — Medication 3 MILLILITER(S): at 03:51

## 2019-02-22 RX ADMIN — Medication 40 MILLIGRAM(S): at 08:00

## 2019-02-22 NOTE — PROGRESS NOTE ADULT - ATTENDING COMMENTS
Carries a diagnosis of eosinophilic asthma. Elevated IgE with peripheral eosinophilia and bronchiectasis on imaging. ABPA and developing vasculitis need to be considered in the diagnosis. Outpatient workup with pulmonologist. Continue with prednisone for now.

## 2019-02-22 NOTE — PROGRESS NOTE ADULT - PROBLEM SELECTOR PLAN 2
leukocytosis with marked eosinophilia on diff, unchanged from prior; prior outpatient pulm work-up for autoimmune diseases with negative ANCA, Sjogrens, ELMER; negative QFG; mildly elevated C4 and RF; notably elevated IgE; prior Strongyloides testing again ordered- 2/20- results testing  consider outpatient skin testing for environmental allergies

## 2019-02-22 NOTE — PROGRESS NOTE ADULT - SUBJECTIVE AND OBJECTIVE BOX
Subjective: Patient seen and examined. No new events except as noted.     SUBJECTIVE/ROS:  feels ok       MEDICATIONS:  MEDICATIONS  (STANDING):  ALBUTerol/ipratropium for Nebulization 3 milliLiter(s) Nebulizer every 6 hours  fluticasone propionate 50 MICROgram(s)/spray Nasal Spray 1 Spray(s) Both Nostrils two times a day  levoFLOXacin  Tablet 500 milliGRAM(s) Oral every 24 hours  predniSONE   Tablet 40 milliGRAM(s) Oral daily      PHYSICAL EXAM:  T(C): 36.5 (02-22-19 @ 14:04), Max: 36.9 (02-21-19 @ 22:02)  HR: 96 (02-22-19 @ 14:04) (70 - 108)  BP: 124/73 (02-22-19 @ 14:04) (118/65 - 133/74)  RR: 16 (02-22-19 @ 14:04) (16 - 16)  SpO2: 97% (02-22-19 @ 14:04) (94% - 98%)  Wt(kg): --  I&O's Summary        Appearance: Normal	  HEENT:   no gross abnormality   Cardiovascular: Normal S1 S2,    Murmur:   Neck: JVP normal  Respiratory: Lungs clear   Gastrointestinal:  Soft, Non-tender  Skin: normal   Neuro: No gross deficits.   Psychiatry:  Mood & affect flat  Ext: No edema      LABS/DATA:    CARDIAC MARKERS:                                14.9   15.44 )-----------( 317      ( 22 Feb 2019 04:30 )             46.9     02-22    138  |  100  |  18  ----------------------------<  119<H>  4.0   |  23  |  1.08    Ca    9.3      22 Feb 2019 04:30  Mg     2.2     02-22      proBNP:   Lipid Profile:   HgA1c:   TSH:     TELE:  EKG:

## 2019-02-22 NOTE — PROGRESS NOTE ADULT - PROBLEM SELECTOR PLAN 3
Meets SIRS criteria secondary to tachycardia/tachypnea and stable leukocytosis  RVP negative, f/u official read of cxr- clear.  afebrile, monitor off of abx  lactate increased in setting of nebs - trend
Meets SIRS criteria secondary to tachycardia/tachypnea and stable leukocytosis  RVP negative, f/u official read of cxr- clear.  afebrile, monitor off of abx  lactate increased in setting of nebs - trend
Meets SIRS criteria secondary to tachycardia/tachypnea and stable leukocytosis  RVP negative, f/u official read of cxr  afebrile, monitor off of abx  lactate increased in setting of nebs - trend
Meets SIRS criteria secondary to tachycardia/tachypnea and stable leukocytosis  RVP negative, f/u official read of cxr- clear.  afebrile, monitor off of abx  lactate increased in setting of nebs - trend

## 2019-02-22 NOTE — PROGRESS NOTE ADULT - PROBLEM SELECTOR PLAN 1
c/w steroids, nebs ATC (next dose now), budesonide/formoterol, fluticasone/nasal saline .  Solumedrol 40 mg q12  pulm consult appreciated-  RVP negative  CXR- clear  ct of chest- bronchectesis c/w steroids, nebs ATC (next dose now), budesonide/formoterol, fluticasone/nasal saline .  Solumedrol 40 mg q12- now changed to Pred 40 mg po qd- d/w Pulmonary.  pulm consult appreciated-  RVP negative  CXR- clear  ct of chest- bronchectesis

## 2019-02-22 NOTE — PROGRESS NOTE ADULT - REASON FOR ADMISSION
shortness of breath, cough

## 2019-02-22 NOTE — PROGRESS NOTE ADULT - ATTENDING COMMENTS
d/w Pulm- ok to start Po levaquin.  f/u ct of chest d/w Pulm- ok to start Po levaquin.  f/u ct of chest- bronchectesis d/w Pulm- ok to start Po levaquin.  f/u ct of chest- bronchiectasis  Patient seen with wife at bedside.  Explain result of CT- mild bronchiectasis- Levaquin started.  Explain echo and tele WNL- use Robitussin for cough.  They want solution for albuterol neb and well as Albuterol HFA .  Pulm rec pred 40 mg ad  until f/u with Dr Escamilla- he will tomeka patient.  Out patient f/u with Pulm and Cardiology.    50 min to coordinate d./c

## 2019-02-22 NOTE — PROGRESS NOTE ADULT - ASSESSMENT
Cough Syncope  due to vagal triggers with persistent cough outbursts  not much event on tele   cough control      Cough / asthma / eosinophilia   on sternoids  fu with pulm     outpt follow up

## 2019-02-22 NOTE — PROGRESS NOTE ADULT - PROBLEM SELECTOR PROBLEM 3
SIRS (systemic inflammatory response syndrome)

## 2019-02-22 NOTE — PROGRESS NOTE ADULT - PROBLEM SELECTOR PLAN 5
few sec at a time with coughing as per patient and wife.  c/w tele.  echo 2/21/19- normal  patient is on tele  Cardiology consult appreciated. few sec at a time with coughing as per patient and wife.  echo 2/21/19- normal  patient is on tele  Cardiology consult appreciated.  Cough Syncope  due to vagal triggers with persistent cough outbursts  not much event on tele   cough control

## 2019-02-22 NOTE — PROGRESS NOTE ADULT - ASSESSMENT
29-year-old male with severe persistent asthma, presenting from home with asthma exacerbation  < from: Xray Chest 2 Views PA/Lat (02.18.19 @ 21:40) >    INTERPRETATION:  clear lungs    Acute Asthma exc with eosinophilia- ?? parasytic related- ? Todd's syndrome? - slowly improving Asthma exc  ? cough variant syncope - c/o few sec LOC with cough- now on Tele    < from: Transthoracic Echocardiogram (02.21.19 @ 13:43) >  CONCLUSIONS:  EF 64 %  1. Normal mitral valve. Minimal mitral regurgitation.  2. Normal left ventricular internal dimensions and wall  thicknesses.  3. Endocardium not well visualized; grossly normal left  ventricular systolic function.  4. Normal left ventricular diastolic function.  5. Normal right ventricular size and function.    < from: CT Chest No Cont (02.22.19 @ 09:05) >  Impression: Very mild bronchiectasis along with mucoid impaction of   multiple bronchi within both lungs.

## 2019-02-22 NOTE — PROGRESS NOTE ADULT - SUBJECTIVE AND OBJECTIVE BOX
Patient is a 29y old  Male who presents with a chief complaint of shortness of breath, cough (22 Feb 2019 10:14)      SUBJECTIVE / OVERNIGHT EVENTS:    MEDICATIONS  (STANDING):  ALBUTerol/ipratropium for Nebulization 3 milliLiter(s) Nebulizer every 6 hours  buDESOnide 160 MICROgram(s)/formoterol 4.5 MICROgram(s) Inhaler 2 Puff(s) Inhalation two times a day  fluticasone propionate 50 MICROgram(s)/spray Nasal Spray 1 Spray(s) Both Nostrils two times a day  levoFLOXacin  Tablet 500 milliGRAM(s) Oral every 24 hours  methylPREDNISolone sodium succinate Injectable 40 milliGRAM(s) IV Push two times a day    MEDICATIONS  (PRN):  ALBUTerol    0.083% 2.5 milliGRAM(s) Nebulizer every 4 hours PRN Shortness of Breath and/or Wheezing  sodium chloride 0.65% Nasal 1 Spray(s) Both Nostrils two times a day PRN Nasal Congestion        PHYSICAL EXAM:  Vital Signs Last 24 Hrs  T(C): 36.3 (22 Feb 2019 05:42), Max: 36.9 (21 Feb 2019 22:02)  T(F): 97.3 (22 Feb 2019 05:42), Max: 98.4 (21 Feb 2019 22:02)  HR: 70 (22 Feb 2019 10:49) (70 - 108)  BP: 118/65 (22 Feb 2019 05:42) (106/52 - 133/74)  BP(mean): --  RR: 16 (22 Feb 2019 05:42) (16 - 16)  SpO2: 98% (22 Feb 2019 10:49) (94% - 98%)  GENERAL: NAD, well-developed  HEAD:  Atraumatic, Normocephalic  EYES: EOMI, PERRLA, conjunctiva and sclera clear  NECK: Supple, No JVD  CHEST/LUNG: Clear to auscultation bilaterally; No wheeze  HEART: Regular rate and rhythm; No murmurs, rubs, or gallops  ABDOMEN: Soft, Nontender, Nondistended; Bowel sounds present  EXTREMITIES:  2+ Peripheral Pulses, No clubbing, cyanosis, or edema  PSYCH: AAOx3  NEUROLOGY: non-focal  SKIN: No rashes or lesions    LABS:                        14.9   15.44 )-----------( 317      ( 22 Feb 2019 04:30 )             46.9     02-22    138  |  100  |  18  ----------------------------<  119<H>  4.0   |  23  |  1.08    Ca    9.3      22 Feb 2019 04:30  Mg     2.2     02-22        RADIOLOGY & ADDITIONAL TESTS:  < from: CT Chest No Cont (02.22.19 @ 09:05) >  Impression: Very mild bronchiectasis along with mucoid impaction of   multiple bronchi within both lungs.    Imaging Personally Reviewed:    Consultant(s) Notes Reviewed:      Care Discussed with Consultants/Other Providers: Patient is a 29y old  Male who presents with a chief complaint of shortness of breath, cough (22 Feb 2019 10:14)      SUBJECTIVE / OVERNIGHT EVENTS:  Patient seen with wife at bedside.  Explain result of CT- mild bronchiectasis- Levaquin started.  Explain echo and tele WNL- use Robitussin for cough.  They want solution for albuterol neb and well as Albuterol HFA .  Patient doing much better.    MEDICATIONS  (STANDING):  ALBUTerol/ipratropium for Nebulization 3 milliLiter(s) Nebulizer every 6 hours  buDESOnide 160 MICROgram(s)/formoterol 4.5 MICROgram(s) Inhaler 2 Puff(s) Inhalation two times a day  fluticasone propionate 50 MICROgram(s)/spray Nasal Spray 1 Spray(s) Both Nostrils two times a day  levoFLOXacin  Tablet 500 milliGRAM(s) Oral every 24 hours  methylPREDNISolone sodium succinate Injectable 40 milliGRAM(s) IV Push two times a day    MEDICATIONS  (PRN):  ALBUTerol    0.083% 2.5 milliGRAM(s) Nebulizer every 4 hours PRN Shortness of Breath and/or Wheezing  sodium chloride 0.65% Nasal 1 Spray(s) Both Nostrils two times a day PRN Nasal Congestion        PHYSICAL EXAM:  Vital Signs Last 24 Hrs  T(C): 36.3 (22 Feb 2019 05:42), Max: 36.9 (21 Feb 2019 22:02)  T(F): 97.3 (22 Feb 2019 05:42), Max: 98.4 (21 Feb 2019 22:02)  HR: 70 (22 Feb 2019 10:49) (70 - 108)  BP: 118/65 (22 Feb 2019 05:42) (106/52 - 133/74)  BP(mean): --  RR: 16 (22 Feb 2019 05:42) (16 - 16)  SpO2: 98% (22 Feb 2019 10:49) (94% - 98%)  GENERAL: NAD, well-developed  HEAD:  Atraumatic, Normocephalic  EYES: EOMI, PERRLA, conjunctiva and sclera clear  NECK: Supple, No JVD  CHEST/LUNG: Clear to auscultation bilaterally; with rare scattered wheezes.  HEART: Regular rate and rhythm; No murmurs, rubs, or gallops  ABDOMEN: Soft, Nontender, Nondistended; Bowel sounds present  EXTREMITIES:  2+ Peripheral Pulses, No clubbing, cyanosis, or edema  PSYCH: AAOx3  NEUROLOGY: non-focal  SKIN: No rashes or lesions    LABS:                        14.9   15.44 )-----------( 317      ( 22 Feb 2019 04:30 )             46.9     02-22    138  |  100  |  18  ----------------------------<  119<H>  4.0   |  23  |  1.08    Ca    9.3      22 Feb 2019 04:30  Mg     2.2     02-22        RADIOLOGY & ADDITIONAL TESTS:  < from: CT Chest No Cont (02.22.19 @ 09:05) >  Impression: Very mild bronchiectasis along with mucoid impaction of   multiple bronchi within both lungs.    Imaging Personally Reviewed:    Consultant(s) Notes Reviewed:      Care Discussed with Consultants/Other Providers:

## 2019-02-22 NOTE — PROGRESS NOTE ADULT - PROBLEM SELECTOR PROBLEM 1
Severe persistent asthma with acute exacerbation

## 2019-02-22 NOTE — PROGRESS NOTE ADULT - SUBJECTIVE AND OBJECTIVE BOX
Interval Events:  Feels much improved  CT chest today shows mild bronchiectasis    REVIEW OF SYSTEMS:  [x] All other systems negative  [ ] Unable to assess ROS because ________    OBJECTIVE:  ICU Vital Signs Last 24 Hrs  T(C): 36.3 (22 Feb 2019 05:42), Max: 36.9 (21 Feb 2019 22:02)  T(F): 97.3 (22 Feb 2019 05:42), Max: 98.4 (21 Feb 2019 22:02)  HR: 70 (22 Feb 2019 10:49) (70 - 108)  BP: 118/65 (22 Feb 2019 05:42) (118/65 - 133/74)  BP(mean): --  ABP: --  ABP(mean): --  RR: 16 (22 Feb 2019 05:42) (16 - 16)  SpO2: 98% (22 Feb 2019 10:49) (94% - 98%)        CAPILLARY BLOOD GLUCOSE          PHYSICAL EXAM:  General: Comfortable, non-toxic  Neck: No JVD  Respiratory:  Bilateral expiratory wheezing throughout  Cardiovascular: RRR, no murmur, no edema  Abdomen: Soft, NT/ND  Extremities: no cyanosis or clubbing  Skin: No rash  Neurological: A&Ox3  Psychiatry: Normal mood and affect.     HOSPITAL MEDICATIONS:    levoFLOXacin  Tablet 500 milliGRAM(s) Oral every 24 hours      predniSONE   Tablet 40 milliGRAM(s) Oral daily    ALBUTerol    0.083% 2.5 milliGRAM(s) Nebulizer every 4 hours PRN  ALBUTerol    90 MICROgram(s) HFA Inhaler 2 Puff(s) Inhalation every 6 hours PRN  ALBUTerol/ipratropium for Nebulization 3 milliLiter(s) Nebulizer every 6 hours  guaiFENesin    Syrup 100 milliGRAM(s) Oral every 6 hours PRN                fluticasone propionate 50 MICROgram(s)/spray Nasal Spray 1 Spray(s) Both Nostrils two times a day  sodium chloride 0.65% Nasal 1 Spray(s) Both Nostrils two times a day PRN        LABS:                        14.9   15.44 )-----------( 317      ( 22 Feb 2019 04:30 )             46.9     Hgb Trend: 14.9<--, 14.0<--, 14.7<--, 15.1<--, 16.0<--  02-22    138  |  100  |  18  ----------------------------<  119<H>  4.0   |  23  |  1.08    Ca    9.3      22 Feb 2019 04:30  Mg     2.2     02-22      Creatinine Trend: 1.08<--, 1.05<--, 1.06<--, 1.21<--, 1.12<--            MICROBIOLOGY:     RADIOLOGY:  [ ] Reviewed and interpreted by me    ASSESSMENT AND RECOMMENDATION:      29 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 Symptoms particularly got worse when he switched from monthly dosing to every two months. He appears non-toxic. Slowly improving.    Change to prednisone 40 mg today  Can go home on prednisone and will have it tapered as outpatient based on clinical response  Primary team started levofloxacin based on positive sputum gram stain showing GNR - will be discharged on course  Albuterol nebs PRN  Symbicort  Strongyloides, and flarial abs pending  Follow up with Dr. Tang as outpatient - can undergo further work up for bronchiectasis as outpatient and continue anti-IL5 therapy  Appointment made for Monday 2/25/19 at 4 PM - patient made aware    Marky Benavides MD  Pulmonary and Critical Care Fellow  [phone]

## 2019-02-25 ENCOUNTER — APPOINTMENT (OUTPATIENT)
Dept: PULMONOLOGY | Facility: CLINIC | Age: 29
End: 2019-02-25
Payer: COMMERCIAL

## 2019-02-25 VITALS
HEIGHT: 72 IN | RESPIRATION RATE: 16 BRPM | SYSTOLIC BLOOD PRESSURE: 114 MMHG | DIASTOLIC BLOOD PRESSURE: 80 MMHG | WEIGHT: 165 LBS | BODY MASS INDEX: 22.35 KG/M2 | HEART RATE: 79 BPM | TEMPERATURE: 99 F

## 2019-02-25 PROCEDURE — 99214 OFFICE O/P EST MOD 30 MIN: CPT | Mod: 25

## 2019-02-25 PROCEDURE — 96372 THER/PROPH/DIAG INJ SC/IM: CPT

## 2019-02-25 RX ORDER — PREDNISONE 10 MG/1
10 TABLET ORAL
Qty: 30 | Refills: 11 | Status: ACTIVE | COMMUNITY
Start: 2019-02-25 | End: 1900-01-01

## 2019-02-25 RX ORDER — BENRALIZUMAB 30 MG/ML
30 INJECTION, SOLUTION SUBCUTANEOUS
Refills: 0 | Status: COMPLETED | OUTPATIENT
Start: 2019-02-25

## 2019-02-25 RX ADMIN — BENRALIZUMAB 0 MG/ML: 30 INJECTION, SOLUTION SUBCUTANEOUS at 00:00

## 2019-02-25 NOTE — REVIEW OF SYSTEMS
[Hay Fever] : hay fever [Itchy Eyes] : itching of ~T the eyes [Nasal Discharge] : nasal discharge [As Noted in HPI] : as noted in HPI [Negative] : Psychiatric

## 2019-02-26 ENCOUNTER — APPOINTMENT (OUTPATIENT)
Dept: PULMONOLOGY | Facility: CLINIC | Age: 29
End: 2019-02-26

## 2019-02-26 NOTE — PHYSICAL EXAM
[General Appearance - Well Developed] : well developed [Normal Appearance] : normal appearance [Well Groomed] : well groomed [General Appearance - Well Nourished] : well nourished [No Deformities] : no deformities [General Appearance - In No Acute Distress] : no acute distress [Normal Conjunctiva] : the conjunctiva exhibited no abnormalities [Eyelids - No Xanthelasma] : the eyelids demonstrated no xanthelasmas [Normal Oropharynx] : normal oropharynx [Neck Appearance] : the appearance of the neck was normal [Neck Cervical Mass (___cm)] : no neck mass was observed [Jugular Venous Distention Increased] : there was no jugular-venous distention [Thyroid Diffuse Enlargement] : the thyroid was not enlarged [Thyroid Nodule] : there were no palpable thyroid nodules [Heart Rate And Rhythm] : heart rate and rhythm were normal [Heart Sounds] : normal S1 and S2 [Murmurs] : no murmurs present [Abdomen Soft] : soft [Abdomen Tenderness] : non-tender [Abdomen Mass (___ Cm)] : no abdominal mass palpated [Nail Clubbing] : no clubbing of the fingernails [Cyanosis, Localized] : no localized cyanosis [Petechial Hemorrhages (___cm)] : no petechial hemorrhages [] : no ischemic changes [FreeTextEntry1] : mild wheezing

## 2019-02-26 NOTE — ASSESSMENT
[FreeTextEntry1] : 1. Severe persistent asthma: Will slowly taper Prednisone over 3 weeks. Fasenra injected today without problem.

## 2019-02-26 NOTE — HISTORY OF PRESENT ILLNESS
[FreeTextEntry1] : Patient just out of the hospital after 5 day admission for exacerbation related to flu. On admission, eosinophil count surprisingly elevated. Patient had cough syncope and telemetry monitoring in hospital. Since discharge has been feeling better, now on Prednisone 40 mg. Still on Symbicort 160 2 puffs bid. Patient now back to baseline, no use of rescue inhaler,

## 2019-02-26 NOTE — PROCEDURE
[FreeTextEntry1] : Fasenra 30 mg subcutaneous injected into Left upper arm\par Patient observed and tolerated injection well.\par \par Lot # [id number]\par Exp: 04/2020\par \par Patient to RTC in 8 weeks for next Fasenra injection

## 2019-02-27 LAB — FILARIA IGG SER-ACNC: 1 — SIGNIFICANT CHANGE UP

## 2019-03-25 ENCOUNTER — APPOINTMENT (OUTPATIENT)
Dept: PULMONOLOGY | Facility: CLINIC | Age: 29
End: 2019-03-25

## 2019-04-22 ENCOUNTER — APPOINTMENT (OUTPATIENT)
Dept: PULMONOLOGY | Facility: CLINIC | Age: 29
End: 2019-04-22
Payer: COMMERCIAL

## 2019-04-22 VITALS
BODY MASS INDEX: 22.89 KG/M2 | WEIGHT: 169 LBS | OXYGEN SATURATION: 95 % | HEIGHT: 72 IN | TEMPERATURE: 97.5 F | HEART RATE: 89 BPM | RESPIRATION RATE: 18 BRPM | SYSTOLIC BLOOD PRESSURE: 100 MMHG | DIASTOLIC BLOOD PRESSURE: 70 MMHG

## 2019-04-22 PROCEDURE — 96401 CHEMO ANTI-NEOPL SQ/IM: CPT

## 2019-04-22 RX ORDER — ALBUTEROL SULFATE 2.5 MG/3ML
(2.5 MG/3ML) SOLUTION RESPIRATORY (INHALATION)
Qty: 0 | Refills: 0 | Status: COMPLETED | OUTPATIENT
Start: 2019-04-22

## 2019-04-22 RX ORDER — BENRALIZUMAB 30 MG/ML
30 INJECTION, SOLUTION SUBCUTANEOUS
Refills: 0 | Status: COMPLETED | OUTPATIENT
Start: 2019-04-22

## 2019-04-22 RX ADMIN — BENRALIZUMAB 30 MG/ML: 30 INJECTION, SOLUTION SUBCUTANEOUS at 00:00

## 2019-04-22 RX ADMIN — ALBUTEROL SULFATE 0 0.083%: 2.5 SOLUTION RESPIRATORY (INHALATION) at 00:00

## 2019-04-22 NOTE — REVIEW OF SYSTEMS
[As Noted in HPI] : as noted in HPI [Postnasal Drip] : postnasal drip [Negative] : Neurologic [Fever] : no fever [Chills] : no chills [Dyspnea] : no dyspnea [Poor Appetite] : normal appetite  [Pleuritic Pain] : no pleuritic pain [Chest Tightness] : no chest tightness [Wheezing] : no wheezing [Edema] : ~T edema was not present [Murmurs] : no murmurs were heard [Back Pain] : ~T no back pain [Arthralgias] : no arthralgias [Easy Bruising] : no ~M tendency for easy bruising

## 2019-04-22 NOTE — HISTORY OF PRESENT ILLNESS
[FreeTextEntry1] : This is a 29 year old male with severe persistent asthma, here for his Fasenra injection. He reports feeling well until the last 2 weeks, when he has been coughing with occassional wheeze. He attributes these symptoms to seasonal allergies. He has stopped taking his Symbicort because he felt much better since starting Fasenra.

## 2019-04-22 NOTE — PROCEDURE
[FreeTextEntry1] : Albuterol via nebulizer lot [id number] exp 3/20\par \par Fasenra 30mg/ml injected SC into left upper arm.  Lot # [id number]  exp 04/2020\par Patient tolerated injection well and was advised to stay for observation for 2+ hours but states will only stay for an hour for observation despite explaining the risks. Patient has his Epipen with him and verbalized understanding of how to use it if required.

## 2019-04-22 NOTE — DISCUSSION/SUMMARY
[FreeTextEntry1] : Instructed patient to go back on Symbicort as directed and resume flonase nasal spray. Will follow up in 8 weeks for Fasenra injection and visit with Dr. Tang.

## 2019-04-22 NOTE — PHYSICAL EXAM
[Normal Appearance] : normal appearance [General Appearance - In No Acute Distress] : no acute distress [Normal Conjunctiva] : the conjunctiva exhibited no abnormalities [Neck Appearance] : the appearance of the neck was normal [Heart Rate And Rhythm] : heart rate and rhythm were normal [Edema] : no peripheral edema present [Murmurs] : no murmurs present [Heart Sounds] : normal S1 and S2 [Respiration, Rhythm And Depth] : normal respiratory rhythm and effort [] : no respiratory distress [Exaggerated Use Of Accessory Muscles For Inspiration] : no accessory muscle use [Nail Clubbing] : no clubbing of the fingernails [Abnormal Walk] : normal gait [Cyanosis, Localized] : no localized cyanosis [No Focal Deficits] : no focal deficits [Oriented To Time, Place, And Person] : oriented to person, place, and time [Mood] : the mood was normal [Affect] : the affect was normal [FreeTextEntry1] : wheeze pre-albuterol, clear lungs after albuterol x 1 [FreeTextEntry2] : no edema noted

## 2019-04-25 ENCOUNTER — APPOINTMENT (OUTPATIENT)
Dept: PULMONOLOGY | Facility: CLINIC | Age: 29
End: 2019-04-25

## 2019-06-12 NOTE — PATIENT PROFILE ADULT - NSPROMEDSPATCH_GEN_A_NUR
Detail Level: Generalized none Text: The above diagnosis and findings were noted on the exam but not discussed at this time with the patient.

## 2019-06-17 ENCOUNTER — APPOINTMENT (OUTPATIENT)
Dept: PULMONOLOGY | Facility: CLINIC | Age: 29
End: 2019-06-17

## 2019-06-19 ENCOUNTER — APPOINTMENT (OUTPATIENT)
Dept: PULMONOLOGY | Facility: CLINIC | Age: 29
End: 2019-06-19
Payer: COMMERCIAL

## 2019-06-19 VITALS
OXYGEN SATURATION: 97 % | WEIGHT: 169 LBS | TEMPERATURE: 97.7 F | HEIGHT: 72 IN | RESPIRATION RATE: 18 BRPM | HEART RATE: 65 BPM | SYSTOLIC BLOOD PRESSURE: 121 MMHG | DIASTOLIC BLOOD PRESSURE: 83 MMHG | BODY MASS INDEX: 22.89 KG/M2

## 2019-06-19 PROCEDURE — 96372 THER/PROPH/DIAG INJ SC/IM: CPT

## 2019-06-19 PROCEDURE — 99214 OFFICE O/P EST MOD 30 MIN: CPT | Mod: 25

## 2019-06-19 RX ORDER — BENRALIZUMAB 30 MG/ML
30 INJECTION, SOLUTION SUBCUTANEOUS
Refills: 0 | Status: COMPLETED | OUTPATIENT
Start: 2019-06-19

## 2019-06-19 RX ADMIN — BENRALIZUMAB 30 MG/ML: 30 INJECTION, SOLUTION SUBCUTANEOUS at 00:00

## 2019-06-19 NOTE — ASSESSMENT
[FreeTextEntry1] : 1. Severe persistent asthma: Asthma now under complete control with just Fasenra. Will inject today.

## 2019-06-19 NOTE — PHYSICAL EXAM
[General Appearance - Well Developed] : well developed [Normal Appearance] : normal appearance [Well Groomed] : well groomed [General Appearance - Well Nourished] : well nourished [No Deformities] : no deformities [General Appearance - In No Acute Distress] : no acute distress [Normal Conjunctiva] : the conjunctiva exhibited no abnormalities [Eyelids - No Xanthelasma] : the eyelids demonstrated no xanthelasmas [Normal Oropharynx] : normal oropharynx [Neck Cervical Mass (___cm)] : no neck mass was observed [Neck Appearance] : the appearance of the neck was normal [Jugular Venous Distention Increased] : there was no jugular-venous distention [Thyroid Diffuse Enlargement] : the thyroid was not enlarged [Thyroid Nodule] : there were no palpable thyroid nodules [Heart Rate And Rhythm] : heart rate and rhythm were normal [Heart Sounds] : normal S1 and S2 [Murmurs] : no murmurs present [Abdomen Soft] : soft [Abdomen Tenderness] : non-tender [Abdomen Mass (___ Cm)] : no abdominal mass palpated [Nail Clubbing] : no clubbing of the fingernails [Cyanosis, Localized] : no localized cyanosis [Petechial Hemorrhages (___cm)] : no petechial hemorrhages [] : no ischemic changes [FreeTextEntry1] : mild wheezing

## 2019-06-19 NOTE — REVIEW OF SYSTEMS
[Hay Fever] : hay fever [Itchy Eyes] : itching of ~T the eyes [As Noted in HPI] : as noted in HPI [Nasal Discharge] : nasal discharge [Negative] : Psychiatric

## 2019-06-19 NOTE — HISTORY OF PRESENT ILLNESS
[FreeTextEntry1] : Patient doing extremely well. Feels like he doesn't even have asthma. STopped using symbicort, no use of rescue inhalers, just getting Fasenra injection every two months.

## 2019-06-19 NOTE — PROCEDURE
[FreeTextEntry1] : Fasenra 30 mg subcutaneous injected into Left upper arm\par Patient observed and tolerated injection well.\par \par Lot # [id number]\par Exp: 04/2020\par \par Patient to RTC in 8 weeks for next Fasenra injection

## 2019-08-14 ENCOUNTER — APPOINTMENT (OUTPATIENT)
Dept: PULMONOLOGY | Facility: CLINIC | Age: 29
End: 2019-08-14

## 2019-08-26 NOTE — DISCHARGE NOTE ADULT - ADDITIONAL INSTRUCTIONS
2 or 3 times a week
Please keep appointment with Dr Tang next week Tuesday/ Wednesday on 2/26 or 2/27/19  please also follow up with Cardiology in 2 weeks

## 2019-10-04 ENCOUNTER — APPOINTMENT (OUTPATIENT)
Dept: PULMONOLOGY | Facility: CLINIC | Age: 29
End: 2019-10-04
Payer: COMMERCIAL

## 2019-10-04 VITALS
SYSTOLIC BLOOD PRESSURE: 121 MMHG | WEIGHT: 175 LBS | RESPIRATION RATE: 16 BRPM | DIASTOLIC BLOOD PRESSURE: 80 MMHG | TEMPERATURE: 98.1 F | BODY MASS INDEX: 23.7 KG/M2 | HEART RATE: 70 BPM | HEIGHT: 72 IN

## 2019-10-04 PROCEDURE — 99214 OFFICE O/P EST MOD 30 MIN: CPT

## 2019-10-04 RX ORDER — BENRALIZUMAB 30 MG/ML
30 INJECTION, SOLUTION SUBCUTANEOUS
Refills: 0 | Status: COMPLETED | OUTPATIENT
Start: 2019-10-04

## 2019-10-04 RX ADMIN — BENRALIZUMAB 30 MG/ML: 30 INJECTION, SOLUTION SUBCUTANEOUS at 00:00

## 2019-10-04 NOTE — PROCEDURE
[FreeTextEntry1] : Administered Fasenra 30mg subcutaneously in the LUE lot [id number] exp 07/2020\par \par Patient tolerated well, no adverse reactions noted. Observed in office for 30 minutes post-injection.

## 2019-10-04 NOTE — HISTORY OF PRESENT ILLNESS
[FreeTextEntry1] : Patient with 1 week of increased symptoms, wheezing, dyspnea. Missed last Fasenra shot.

## 2019-10-04 NOTE — ASSESSMENT
[FreeTextEntry1] : 1, Severe asthma: Now with exacerbation related to cold weather and noncompliance with Fasenra, will give short course of Prednisone and Fasenra injection today. return to office in 2 months for injection, 4 months for f/u or sooner if symptomatic.

## 2019-10-04 NOTE — PHYSICAL EXAM
[General Appearance - Well Developed] : well developed [Normal Appearance] : normal appearance [Well Groomed] : well groomed [General Appearance - Well Nourished] : well nourished [No Deformities] : no deformities [Normal Conjunctiva] : the conjunctiva exhibited no abnormalities [General Appearance - In No Acute Distress] : no acute distress [Eyelids - No Xanthelasma] : the eyelids demonstrated no xanthelasmas [Normal Oropharynx] : normal oropharynx [Neck Appearance] : the appearance of the neck was normal [Jugular Venous Distention Increased] : there was no jugular-venous distention [Neck Cervical Mass (___cm)] : no neck mass was observed [Thyroid Diffuse Enlargement] : the thyroid was not enlarged [Thyroid Nodule] : there were no palpable thyroid nodules [Heart Rate And Rhythm] : heart rate and rhythm were normal [Heart Sounds] : normal S1 and S2 [Murmurs] : no murmurs present [Abdomen Soft] : soft [Abdomen Tenderness] : non-tender [Abdomen Mass (___ Cm)] : no abdominal mass palpated [Abnormal Walk] : normal gait [Gait - Sufficient For Exercise Testing] : the gait was sufficient for exercise testing [Nail Clubbing] : no clubbing of the fingernails [Petechial Hemorrhages (___cm)] : no petechial hemorrhages [] : no ischemic changes [Cyanosis, Localized] : no localized cyanosis [FreeTextEntry1] : bilateral wheezing

## 2019-12-20 ENCOUNTER — APPOINTMENT (OUTPATIENT)
Dept: PULMONOLOGY | Facility: CLINIC | Age: 29
End: 2019-12-20
Payer: COMMERCIAL

## 2019-12-20 VITALS
DIASTOLIC BLOOD PRESSURE: 74 MMHG | BODY MASS INDEX: 23.98 KG/M2 | OXYGEN SATURATION: 97 % | HEIGHT: 72 IN | WEIGHT: 177 LBS | RESPIRATION RATE: 17 BRPM | HEART RATE: 69 BPM | TEMPERATURE: 97.9 F | SYSTOLIC BLOOD PRESSURE: 112 MMHG

## 2019-12-20 PROCEDURE — 96372 THER/PROPH/DIAG INJ SC/IM: CPT

## 2019-12-20 RX ORDER — BENRALIZUMAB 30 MG/ML
30 INJECTION, SOLUTION SUBCUTANEOUS
Refills: 0 | Status: COMPLETED | OUTPATIENT
Start: 2019-12-20

## 2019-12-20 RX ADMIN — BENRALIZUMAB 30 MG/ML: 30 INJECTION, SOLUTION SUBCUTANEOUS at 00:00

## 2019-12-20 NOTE — HISTORY OF PRESENT ILLNESS
[FreeTextEntry1] : This is a 29 year old male with severe persistent asthma, here for his Fasenra injection. Doing well on Fasenra, Symbicort, Flonase. Not requiring rescue inhaler since last visit.

## 2019-12-20 NOTE — PROCEDURE
[FreeTextEntry1] : Fasenra 30mg/ml injected SC into left upper arm.  Lot # [id number] exp 06/2021\par \par Patient tolerated injection well and was advised to stay for observation for 2+ hours but states will only stay for an hour for observation despite explaining the risks. Patient has his Epipen with him and verbalized understanding of how to use it if required.

## 2019-12-20 NOTE — REVIEW OF SYSTEMS
[Fever] : no fever [Chills] : no chills [Poor Appetite] : normal appetite  [Postnasal Drip] : postnasal drip [As Noted in HPI] : as noted in HPI [Dyspnea] : no dyspnea [Chest Tightness] : no chest tightness [Pleuritic Pain] : no pleuritic pain [Wheezing] : no wheezing [Murmurs] : no murmurs were heard [Edema] : ~T edema was not present [Back Pain] : ~T no back pain [Arthralgias] : no arthralgias [Easy Bruising] : no ~M tendency for easy bruising [Negative] : Psychiatric

## 2019-12-20 NOTE — ASSESSMENT
[FreeTextEntry1] : Asthma: Continue on Symbicort and flonase nasal spray. Will follow up in 8 weeks for Fasenra injection and visit with Dr. Tang. Paperwork filled out to transition Fasenra to home use, intention to demonstrate and administer first Fasenra Pen at f/u visit in 8 weeks.

## 2019-12-20 NOTE — PHYSICAL EXAM
[Normal Appearance] : normal appearance [General Appearance - In No Acute Distress] : no acute distress [Neck Appearance] : the appearance of the neck was normal [Normal Conjunctiva] : the conjunctiva exhibited no abnormalities [Heart Rate And Rhythm] : heart rate and rhythm were normal [Heart Sounds] : normal S1 and S2 [Murmurs] : no murmurs present [Edema] : no peripheral edema present [] : no respiratory distress [Respiration, Rhythm And Depth] : normal respiratory rhythm and effort [Exaggerated Use Of Accessory Muscles For Inspiration] : no accessory muscle use [Auscultation Breath Sounds / Voice Sounds] : lungs were clear to auscultation bilaterally [Abnormal Walk] : normal gait [Nail Clubbing] : no clubbing of the fingernails [Cyanosis, Localized] : no localized cyanosis [FreeTextEntry2] : no edema noted  [No Focal Deficits] : no focal deficits [Oriented To Time, Place, And Person] : oriented to person, place, and time [Mood] : the mood was normal [Affect] : the affect was normal

## 2020-02-10 ENCOUNTER — APPOINTMENT (OUTPATIENT)
Dept: PULMONOLOGY | Facility: CLINIC | Age: 30
End: 2020-02-10

## 2020-09-14 NOTE — ED ADULT TRIAGE NOTE - SPO2 (%)
93 Benzoyl Peroxide Counseling: Patient counseled that medicine may cause skin irritation and bleach clothing.  In the event of skin irritation, the patient was advised to reduce the amount of the drug applied or use it less frequently.   The patient verbalized understanding of the proper use and possible adverse effects of benzoyl peroxide.  All of the patient's questions and concerns were addressed.

## 2020-11-30 RX ORDER — LORATADINE 10 MG/1
10 TABLET ORAL
Qty: 30 | Refills: 5 | Status: ACTIVE | COMMUNITY
Start: 2018-06-06 | End: 1900-01-01

## 2021-06-02 ENCOUNTER — APPOINTMENT (OUTPATIENT)
Dept: PULMONOLOGY | Facility: CLINIC | Age: 31
End: 2021-06-02

## 2021-06-04 ENCOUNTER — NON-APPOINTMENT (OUTPATIENT)
Age: 31
End: 2021-06-04

## 2021-12-21 ENCOUNTER — NON-APPOINTMENT (OUTPATIENT)
Age: 31
End: 2021-12-21

## 2021-12-22 RX ORDER — PREDNISONE 20 MG/1
20 TABLET ORAL DAILY
Qty: 10 | Refills: 0 | Status: ACTIVE | COMMUNITY
Start: 2019-10-04 | End: 1900-01-01

## 2021-12-28 ENCOUNTER — APPOINTMENT (OUTPATIENT)
Dept: PULMONOLOGY | Facility: CLINIC | Age: 31
End: 2021-12-28
Payer: COMMERCIAL

## 2021-12-28 VITALS
BODY MASS INDEX: 24.11 KG/M2 | DIASTOLIC BLOOD PRESSURE: 80 MMHG | OXYGEN SATURATION: 97 % | RESPIRATION RATE: 18 BRPM | HEIGHT: 72 IN | WEIGHT: 178 LBS | TEMPERATURE: 97.3 F | HEART RATE: 77 BPM | SYSTOLIC BLOOD PRESSURE: 110 MMHG

## 2021-12-28 PROCEDURE — 99214 OFFICE O/P EST MOD 30 MIN: CPT

## 2021-12-28 RX ORDER — PREDNISONE 20 MG/1
20 TABLET ORAL DAILY
Qty: 10 | Refills: 1 | Status: ACTIVE | COMMUNITY
Start: 2021-12-28 | End: 1900-01-01

## 2021-12-28 RX ORDER — FLUTICASONE PROPIONATE 50 UG/1
50 SPRAY, METERED NASAL DAILY
Qty: 1 | Refills: 11 | Status: ACTIVE | COMMUNITY
Start: 2021-12-28 | End: 1900-01-01

## 2021-12-28 NOTE — ASSESSMENT
[FreeTextEntry1] : Severe asthma: NOw improving since resarting fasenra. Will give monthly for 3 months and then bimonthly. Restart symbicort. Renew nasal spray. Prednisone for 5 more days for exacerbation.

## 2021-12-28 NOTE — HISTORY OF PRESENT ILLNESS
[TextBox_4] : Patient had run out of fasenra secondary to insurance issues, back in June. After a few months, had worsening symptoms. Last week needed PRednisone for an acute worsening. Last week, given a sample of Fasenra and has noticed a gradual improvement in symptoms. Has been on symbicort, but not religiously since restarting Fasenral.

## 2022-02-03 ENCOUNTER — NON-APPOINTMENT (OUTPATIENT)
Age: 32
End: 2022-02-03

## 2022-07-07 RX ORDER — ALBUTEROL SULFATE 90 UG/1
108 (90 BASE) INHALANT RESPIRATORY (INHALATION)
Qty: 30 | Refills: 5 | Status: ACTIVE | COMMUNITY
Start: 2018-06-06 | End: 1900-01-01

## 2022-07-19 ENCOUNTER — APPOINTMENT (OUTPATIENT)
Dept: PULMONOLOGY | Facility: CLINIC | Age: 32
End: 2022-07-19

## 2022-07-19 VITALS
WEIGHT: 172 LBS | HEIGHT: 72 IN | TEMPERATURE: 97.5 F | OXYGEN SATURATION: 94 % | BODY MASS INDEX: 23.3 KG/M2 | RESPIRATION RATE: 17 BRPM | HEART RATE: 80 BPM | DIASTOLIC BLOOD PRESSURE: 68 MMHG | SYSTOLIC BLOOD PRESSURE: 112 MMHG

## 2022-07-19 DIAGNOSIS — J45.50 SEVERE PERSISTENT ASTHMA, UNCOMPLICATED: ICD-10-CM

## 2022-07-19 DIAGNOSIS — J45.909 UNSPECIFIED ASTHMA, UNCOMPLICATED: ICD-10-CM

## 2022-07-19 PROCEDURE — 96372 THER/PROPH/DIAG INJ SC/IM: CPT

## 2022-07-19 PROCEDURE — 99214 OFFICE O/P EST MOD 30 MIN: CPT

## 2022-07-21 ENCOUNTER — TRANSCRIPTION ENCOUNTER (OUTPATIENT)
Age: 32
End: 2022-07-21

## 2022-08-11 NOTE — ASSESSMENT
[FreeTextEntry1] : Asthma, severe persistent: pt has been on Fasenra for eosinopihlic asthma since 2018 that has reduced his number of exacerbations and hospitalizations for asthma. Will reapply for therapy and refill symbicort for ongoing continuity of care.\par Samples given to restart therapy - per pt will administer in home. \par RTC in 4 months\par \par I, Ida Dan NP, am scribing for and in the presence of Dr. Sy Tang, the following sections HISTORY OF PRESENT ILLNESS, PAST MEDICAL/FAMILY/SOCIAL HISTORY; REVIEW OF SYSTEMS; VITAL SIGNS; PHYSICAL EXAM; DISPOSITION.\par

## 2022-09-06 ENCOUNTER — NON-APPOINTMENT (OUTPATIENT)
Age: 32
End: 2022-09-06

## 2022-09-12 RX ORDER — BUDESONIDE AND FORMOTEROL FUMARATE DIHYDRATE 160; 4.5 UG/1; UG/1
160-4.5 AEROSOL RESPIRATORY (INHALATION) TWICE DAILY
Qty: 1 | Refills: 5 | Status: ACTIVE | COMMUNITY
Start: 2018-06-06 | End: 1900-01-01

## 2022-09-12 NOTE — END OF VISIT
[Time Spent: ___ minutes] : I have spent [unfilled] minutes of face to face time with the patient [facility] Pregnancy And Lactation Text: It is unknown if this medication will adversely affect pregnancy or breast feeding.  You should not take this medication if you are currently pregnant or planning a pregnancy or while breastfeeding.

## 2022-09-29 ENCOUNTER — NON-APPOINTMENT (OUTPATIENT)
Age: 32
End: 2022-09-29

## 2022-10-04 ENCOUNTER — NON-APPOINTMENT (OUTPATIENT)
Age: 32
End: 2022-10-04

## 2022-10-27 ENCOUNTER — NON-APPOINTMENT (OUTPATIENT)
Age: 32
End: 2022-10-27

## 2022-10-28 ENCOUNTER — APPOINTMENT (OUTPATIENT)
Dept: PULMONOLOGY | Facility: CLINIC | Age: 32
End: 2022-10-28
Payer: COMMERCIAL

## 2022-10-28 VITALS
HEART RATE: 65 BPM | HEIGHT: 72 IN | TEMPERATURE: 98.4 F | BODY MASS INDEX: 24.38 KG/M2 | DIASTOLIC BLOOD PRESSURE: 71 MMHG | WEIGHT: 180 LBS | OXYGEN SATURATION: 97 % | SYSTOLIC BLOOD PRESSURE: 108 MMHG

## 2022-10-28 PROCEDURE — 96372 THER/PROPH/DIAG INJ SC/IM: CPT

## 2022-10-28 NOTE — PROCEDURE
[FreeTextEntry1] : Fasenra 30 mg sq pen autoinjector injected into RUE\par Patient observed and tolerated injection well. \par \par LOT- [id number] \par EXP- 07/2024\par NDC- 9785-1437-81\par \par Patient to RTC in 8 weeks for next Fasenra injection. \par

## 2022-10-28 NOTE — HISTORY OF PRESENT ILLNESS
[TextBox_4] : Patient 33yo M here for Fasenra injection. He has been off of Fasenra since June 2019. Overall he is doing well, denies SOB, wheezing, fever and chills. Asked pt if he is able to change his insurance plan so he can get medication delivered to his home instead, however he stated he is not able to unfortunately.

## 2022-10-28 NOTE — PROCEDURE
[FreeTextEntry1] : Fasenra 30MG sq autoinjector injected into RUE \par \par LOT- [id number] \par EXP- 07/2024\par NDC- 1955-3024-06\par \par patient to RTC in 8 weeks for next Fasenra injection.

## 2022-10-28 NOTE — HISTORY OF PRESENT ILLNESS
[TextBox_4] : Pt [place] 31yo M here for Fasenra injection.He has been off Fasenra since June 2021. Overall doing well, no SOB, denies wheezing fever and chills. Pt stated he cannot change his new insurance plan in order to receive home delivery for injection, therefore will RTC every 8 weeks.

## 2022-10-28 NOTE — HISTORY OF PRESENT ILLNESS
[TextBox_4] : Pt [place] 33yo M here for Fasenra injection.He has been off Fasenra since June 2021. Overall doing well, no SOB, denies wheezing fever and chills. Pt stated he cannot change his new insurance plan in order to receive home delivery for injection, therefore will RTC every 8 weeks.

## 2022-10-28 NOTE — PHYSICAL EXAM
[Normal Rate/Rhythm] : normal rate/rhythm [Normal S1, S2] : normal s1, s2 [No Edema] : no edema [No Focal Deficits] : no focal deficits [Oriented x3] : oriented x3 [No Acute Distress] : no acute distress [No Resp Distress] : no resp distress [Clear to Auscultation Bilaterally] : clear to auscultation bilaterally

## 2022-10-28 NOTE — PROCEDURE
[FreeTextEntry1] : Fasenra 30 mg sq pen autoinjector injected into RUE\par Patient observed and tolerated injection well. \par \par LOT- [id number] \par EXP- 07/2024\par NDC- 5597-5262-31\par \par Patient to RTC in 8 weeks for next Fasenra injection. \par

## 2022-12-20 ENCOUNTER — APPOINTMENT (OUTPATIENT)
Dept: PULMONOLOGY | Facility: CLINIC | Age: 32
End: 2022-12-20

## 2023-01-10 ENCOUNTER — APPOINTMENT (OUTPATIENT)
Dept: PULMONOLOGY | Facility: CLINIC | Age: 33
End: 2023-01-10

## 2023-04-20 RX ORDER — FLUTICASONE PROPIONATE 50 UG/1
50 SPRAY, METERED NASAL 3 TIMES DAILY
Qty: 1 | Refills: 11 | Status: ACTIVE | COMMUNITY
Start: 2018-06-06 | End: 1900-01-01

## 2023-05-16 ENCOUNTER — NON-APPOINTMENT (OUTPATIENT)
Age: 33
End: 2023-05-16

## 2024-01-10 ENCOUNTER — RX RENEWAL (OUTPATIENT)
Age: 34
End: 2024-01-10

## 2024-04-30 NOTE — DISCHARGE NOTE ADULT - MEDICATION SUMMARY - MEDICATIONS TO CHANGE
cleansed/copious irrigation I will SWITCH the dose or number of times a day I take the medications listed below when I get home from the hospital:  None

## 2024-05-06 RX ORDER — BUDESONIDE AND FORMOTEROL FUMARATE DIHYDRATE 160; 4.5 UG/1; UG/1
160-4.5 AEROSOL RESPIRATORY (INHALATION) TWICE DAILY
Qty: 1 | Refills: 0 | Status: ACTIVE | COMMUNITY
Start: 2021-12-28 | End: 1900-01-01

## 2024-06-14 RX ORDER — BENRALIZUMAB 30 MG/ML
30 INJECTION, SOLUTION SUBCUTANEOUS
Qty: 1 | Refills: 0 | Status: ACTIVE | COMMUNITY
Start: 2019-12-20 | End: 1900-01-01

## 2024-07-12 ENCOUNTER — NON-APPOINTMENT (OUTPATIENT)
Age: 34
End: 2024-07-12

## 2024-07-29 ENCOUNTER — APPOINTMENT (OUTPATIENT)
Dept: PULMONOLOGY | Facility: CLINIC | Age: 34
End: 2024-07-29

## 2024-08-02 NOTE — ED PROVIDER NOTE - NS_EDPROVIDERDISPOUSERTYPE_ED_A_ED
LMOM for patient to phone back and schedule a fup appt for medication review   Scribe Attestation (For Scribes USE Only)... Attending Attestation (For Attendings USE Only).../Scribe Attestation (For Scribes USE Only)...

## 2024-09-09 ENCOUNTER — NON-APPOINTMENT (OUTPATIENT)
Age: 34
End: 2024-09-09

## 2024-09-10 ENCOUNTER — APPOINTMENT (OUTPATIENT)
Dept: PULMONOLOGY | Facility: CLINIC | Age: 34
End: 2024-09-10
Payer: COMMERCIAL

## 2024-09-10 VITALS
HEART RATE: 75 BPM | DIASTOLIC BLOOD PRESSURE: 71 MMHG | BODY MASS INDEX: 25.73 KG/M2 | RESPIRATION RATE: 16 BRPM | WEIGHT: 190 LBS | TEMPERATURE: 97.4 F | HEIGHT: 72 IN | OXYGEN SATURATION: 98 % | SYSTOLIC BLOOD PRESSURE: 105 MMHG

## 2024-09-10 DIAGNOSIS — J45.50 SEVERE PERSISTENT ASTHMA, UNCOMPLICATED: ICD-10-CM

## 2024-09-10 PROCEDURE — 99214 OFFICE O/P EST MOD 30 MIN: CPT

## 2024-09-10 NOTE — HISTORY OF PRESENT ILLNESS
[TextBox_4] : 32 year old PMH severe persistent asthma- Continues on Symbicort but has had difficulty obtaining Fasenra. Denies ED stays, prednisone use/ hospitalizations. Has not used Fasenra all year as a result of non coverage  9/10/24: Summary : The patient, Mr. Cole Deras, presented for a follow-up visit for severe eosinophilic asthma. He had been off his medication but has been fairing well and had not visited the ER in the last two years. - Chief Complaint (CC) : Patient reported no major health concerns, and has been off medication but is doing well without any major symptom flare-ups. - History of Present Illness : Mr. Deras, a 28-year-old , well-recognized with a diagnosis of severe eosinophilic asthma, reported on follow-up. He has been off the Fasenra and Simbacort for some time but reports no exacerbations of symptoms. He reports that he has not been using albuterol in the last two weeks and has not visited the ER or used prednisone in the last two years. - Past Medical History : Patient has a known history of severe eosinophilic asthma.

## 2024-09-10 NOTE — ASSESSMENT
[FreeTextEntry1] : Asthma, severe persistent: pt has been on Fasenra for eosinopihlic asthma since 2018 that has reduced his number of exacerbations and hospitalizations for asthma. Will reapply for therapy and refill symbicort for ongoing continuity of care. Samples given to restart therapy - per pt will administer in home.  RTC in 4 months  IIda NP, am scribing for and in the presence of Dr. Sy Tang, the following sections HISTORY OF PRESENT ILLNESS, PAST MEDICAL/FAMILY/SOCIAL HISTORY; REVIEW OF SYSTEMS; VITAL SIGNS; PHYSICAL EXAM; DISPOSITION.  9/10/24: Severe Eosinophilic Asthma : The patient has been off his Fasenra and Simbacort medications but has been managing well without any major symptom flare-ups, ER visits, or prednisone use. - Therapeutic Interventions: Plan to renew Fasenra medication, which is to be taken every other month. Also, recommending using Simbacort 80 as needed and discontinue Ventolin use.  - Diagnostic Tests: No additional tests needed at this time.  - Referrals: No referrals needed at this time.  - Patient Education: Reinforced the importance of taking Fasenra as scheduled and using Simbacort as a reliever therapy.  - Follow-Up: The patient is to return in a year for another follow-up.

## 2024-10-12 ENCOUNTER — NON-APPOINTMENT (OUTPATIENT)
Age: 34
End: 2024-10-12